# Patient Record
Sex: MALE | Race: BLACK OR AFRICAN AMERICAN | Employment: OTHER | URBAN - METROPOLITAN AREA
[De-identification: names, ages, dates, MRNs, and addresses within clinical notes are randomized per-mention and may not be internally consistent; named-entity substitution may affect disease eponyms.]

---

## 2017-01-05 ENCOUNTER — OFFICE VISIT (OUTPATIENT)
Dept: PAIN MANAGEMENT | Age: 67
End: 2017-01-05

## 2017-01-05 VITALS — HEART RATE: 80 BPM | SYSTOLIC BLOOD PRESSURE: 128 MMHG | RESPIRATION RATE: 17 BRPM | DIASTOLIC BLOOD PRESSURE: 76 MMHG

## 2017-01-05 DIAGNOSIS — M47.816 SPONDYLOSIS OF LUMBAR REGION WITHOUT MYELOPATHY OR RADICULOPATHY: ICD-10-CM

## 2017-01-05 DIAGNOSIS — M47.816 FACET ARTHRITIS OF LUMBAR REGION: ICD-10-CM

## 2017-01-05 DIAGNOSIS — G89.4 CHRONIC PAIN SYNDROME: Primary | ICD-10-CM

## 2017-01-05 DIAGNOSIS — M48.061 LUMBAR FORAMINAL STENOSIS: ICD-10-CM

## 2017-01-05 DIAGNOSIS — M54.50 CHRONIC LEFT-SIDED LOW BACK PAIN WITHOUT SCIATICA: ICD-10-CM

## 2017-01-05 DIAGNOSIS — Z79.899 ENCOUNTER FOR LONG-TERM (CURRENT) USE OF HIGH-RISK MEDICATION: ICD-10-CM

## 2017-01-05 DIAGNOSIS — G89.29 CHRONIC LEFT-SIDED LOW BACK PAIN WITHOUT SCIATICA: ICD-10-CM

## 2017-01-05 RX ORDER — HYDROCODONE BITARTRATE AND ACETAMINOPHEN 10; 325 MG/1; MG/1
TABLET ORAL
Qty: 90 TAB | Refills: 0 | Status: SHIPPED | OUTPATIENT
Start: 2017-01-05 | End: 2017-01-05 | Stop reason: SDUPTHER

## 2017-01-05 RX ORDER — HYDROCODONE BITARTRATE AND ACETAMINOPHEN 10; 325 MG/1; MG/1
TABLET ORAL
Qty: 90 TAB | Refills: 0 | Status: SHIPPED | OUTPATIENT
Start: 2017-01-05 | End: 2017-06-13

## 2017-01-05 NOTE — PATIENT INSTRUCTIONS
1.  Please do not self increase your medication. 2.  Increase norco to 10/325 one tab  Up to 2-3 times per day prn severe pain  3.   Follow up in two months

## 2017-01-05 NOTE — PROGRESS NOTES
Nursing Notes    Patient presents to the office today in follow-up. Reviewed medications with counts as follows:    Rx Date filled Qty Dispensed Pill Count Last Dose Short   norco 7.5/325 12/8/16 90 0 This am. 1 dose Yes. 2 tabs   Mr. Mirtha Engel has a reminder for a \"due or due soon\" health maintenance. I have asked that he contact his primary care provider for follow-up on this health maintenance. Comments: admits to self increasing. Pill count included one day eri period rule     POC UDS was not performed in office today    Any new labs or imaging since last appointment? NO    Have you been to an emergency room (ER) or urgent care clinic since your last visit? NO            Have you been hospitalized since your last visit? NO     If yes, where, when, and reason for visit? Have you seen or consulted any other health care providers outside of the [facility]  since your last visit? NO     If yes, where, when, and reason for visit?

## 2017-03-09 ENCOUNTER — OFFICE VISIT (OUTPATIENT)
Dept: PAIN MANAGEMENT | Age: 67
End: 2017-03-09

## 2017-03-09 VITALS
HEIGHT: 71 IN | BODY MASS INDEX: 25.48 KG/M2 | DIASTOLIC BLOOD PRESSURE: 75 MMHG | WEIGHT: 182 LBS | SYSTOLIC BLOOD PRESSURE: 119 MMHG | HEART RATE: 72 BPM

## 2017-03-09 DIAGNOSIS — G89.29 CHRONIC LEFT-SIDED LOW BACK PAIN WITHOUT SCIATICA: ICD-10-CM

## 2017-03-09 DIAGNOSIS — Z79.899 ENCOUNTER FOR LONG-TERM (CURRENT) USE OF MEDICATIONS: ICD-10-CM

## 2017-03-09 DIAGNOSIS — M54.50 CHRONIC LEFT-SIDED LOW BACK PAIN WITHOUT SCIATICA: ICD-10-CM

## 2017-03-09 DIAGNOSIS — G89.4 CHRONIC PAIN SYNDROME: Primary | ICD-10-CM

## 2017-03-09 DIAGNOSIS — M48.061 LUMBAR FORAMINAL STENOSIS: ICD-10-CM

## 2017-03-09 DIAGNOSIS — M47.816 FACET ARTHRITIS OF LUMBAR REGION: ICD-10-CM

## 2017-03-09 RX ORDER — HYDROCODONE BITARTRATE AND ACETAMINOPHEN 7.5; 325 MG/1; MG/1
1 TABLET ORAL 3 TIMES DAILY
Qty: 90 TAB | Refills: 0 | Status: SHIPPED | OUTPATIENT
Start: 2017-03-09 | End: 2017-06-13 | Stop reason: SDUPTHER

## 2017-03-09 RX ORDER — HYDROCODONE BITARTRATE AND ACETAMINOPHEN 7.5; 325 MG/1; MG/1
1 TABLET ORAL 3 TIMES DAILY
Qty: 90 TAB | Refills: 0 | Status: SHIPPED | OUTPATIENT
Start: 2017-03-09 | End: 2017-05-10 | Stop reason: SDUPTHER

## 2017-03-09 NOTE — PROGRESS NOTES
Nursing Notes    Patient presents to the office today in follow-up. Patient rates his pain at 6/10 on the numerical pain scale. Reviewed medications with counts as follows:    Rx Date filled Qty Dispensed Pill Count Last Dose Short   norco 7.5/325 mg 02/05/17 90 1 today no                                    POC UDS was not performed in office today    Any new labs or imaging since last appointment? NO    Have you been to an emergency room (ER) or urgent care clinic since your last visit? NO            Have you been hospitalized since your last visit? NO     If yes, where, when, and reason for visit? Have you seen or consulted any other health care providers outside of the [address] [place] [address]  since your last visit? NO     If yes, where, when, and reason for visit? HM deferred to pcp.

## 2017-03-09 NOTE — PATIENT INSTRUCTIONS
1.  Continue medications as prescribed, but decrease norco back to 7.5/325 up to 3 times per day  2.   Follow up in two months

## 2017-03-09 NOTE — PROGRESS NOTES
HISTORY OF PRESENT ILLNESS  Royal ZOIE Harman is a 74 y.o. male. HPI  The patient presents today for follow up of chronic  low back pain. No h/o lumbar surgery. Several lumbar epidurals in the past with some temporary benefit. He reports the norco 7.5/325 seemed to work better for him. He is also only working 20 hours per week which seems to have alleviated his pain. He reports he has been able to go a day or two without taking his medication if the pain was not as severe. Colder weather seems to exacerbate his pain. The patient denies any significant changes since last f/u. He tolerates medications without side effects. Royal D Art Record reports no change in sleep or constipation. He does not take a sleep aid. He does not have sleep apnea. He denies constipation. The patient reports 40-60% relief with current medications. He describes his pain as constant, achy and sometimes radiating into his left hip. No h/o joint replacements. 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 The patient reports functional improvement and QOL with pain medication. UDS 11/9/16 reviewed and consistent. Review of Systems   Constitutional: Negative for chills and fever. HENT: Negative for congestion and sore throat. Eyes: Negative for blurred vision and double vision. Respiratory: Negative for cough, shortness of breath and wheezing. Cardiovascular: Negative for chest pain and palpitations. Gastrointestinal: Negative for constipation, heartburn and nausea. Genitourinary: Negative for dysuria and urgency. Musculoskeletal: Positive for back pain (occasional) and joint pain. Negative for neck pain. Skin: Negative for itching and rash. Neurological: Negative for dizziness, seizures and headaches. Endo/Heme/Allergies: Negative for environmental allergies. Does not bruise/bleed easily. Psychiatric/Behavioral: Negative for depression and suicidal ideas. The patient has insomnia. Physical Exam   Constitutional: He is oriented to person, place, and time. He appears well-developed and well-nourished. No distress. HENT:   Head: Normocephalic. Eyes: EOM are normal.   Neck: Normal range of motion. No spinous process tenderness and no muscular tenderness present. Normal range of motion present. Pulmonary/Chest: Effort normal.   Musculoskeletal: He exhibits tenderness (tenderness throughout lumbar spine/painful ROM). Lumbar back: He exhibits decreased range of motion (painful), tenderness, pain and spasm. Neurological: He is alert and oriented to person, place, and time. Gait (antalgic) abnormal.   Psychiatric: He has a normal mood and affect. His behavior is normal. Judgment and thought content normal.   Nursing note and vitals reviewed. ASSESSMENT and PLAN  Encounter Diagnoses   Name Primary?  Chronic pain syndrome Yes    Encounter for long-term (current) use of medications     Facet arthritis of lumbar region     Lumbar foraminal stenosis     Chronic left-sided low back pain without sciatica      Orders Placed This Encounter    HYDROcodone-acetaminophen (NORCO) 7.5-325 mg per tablet    HYDROcodone-acetaminophen (NORCO) 7.5-325 mg per tablet     Patient Instructions   1. Continue medications as prescribed, but decrease norco back to 7.5/325 up to 3 times per day  2.   Follow up in two months

## 2017-05-10 RX ORDER — HYDROCODONE BITARTRATE AND ACETAMINOPHEN 7.5; 325 MG/1; MG/1
1 TABLET ORAL 3 TIMES DAILY
Qty: 90 TAB | Refills: 0 | Status: SHIPPED | OUTPATIENT
Start: 2017-05-10 | End: 2017-06-13 | Stop reason: SDUPTHER

## 2017-05-10 NOTE — TELEPHONE ENCOUNTER
The pt called the office because his appt for 05/11/17 was cancelled by the office. The provider will not be here. The pt will need medications to make it to the next scheduled appt. He is tolerating his medication at this time. A  was obtained and there were no aberrancies noted. His last fill for his norco was 04/08/17.

## 2017-06-13 ENCOUNTER — OFFICE VISIT (OUTPATIENT)
Dept: PAIN MANAGEMENT | Age: 67
End: 2017-06-13

## 2017-06-13 VITALS — HEART RATE: 77 BPM | DIASTOLIC BLOOD PRESSURE: 81 MMHG | SYSTOLIC BLOOD PRESSURE: 113 MMHG

## 2017-06-13 DIAGNOSIS — G89.4 CHRONIC PAIN SYNDROME: ICD-10-CM

## 2017-06-13 DIAGNOSIS — M47.816 FACET ARTHRITIS OF LUMBAR REGION: ICD-10-CM

## 2017-06-13 DIAGNOSIS — G89.29 CHRONIC LEFT-SIDED LOW BACK PAIN WITHOUT SCIATICA: Primary | ICD-10-CM

## 2017-06-13 DIAGNOSIS — M48.061 LUMBAR FORAMINAL STENOSIS: ICD-10-CM

## 2017-06-13 DIAGNOSIS — Z79.899 ENCOUNTER FOR LONG-TERM (CURRENT) USE OF MEDICATIONS: ICD-10-CM

## 2017-06-13 DIAGNOSIS — M47.816 SPONDYLOSIS OF LUMBAR REGION WITHOUT MYELOPATHY OR RADICULOPATHY: ICD-10-CM

## 2017-06-13 DIAGNOSIS — M54.50 CHRONIC LEFT-SIDED LOW BACK PAIN WITHOUT SCIATICA: Primary | ICD-10-CM

## 2017-06-13 LAB
ALCOHOL UR POC: NORMAL
AMPHETAMINES UR POC: NEGATIVE
BARBITURATES UR POC: NEGATIVE
BENZODIAZEPINES UR POC: NEGATIVE
BUPRENORPHINE UR POC: NORMAL
CANNABINOIDS UR POC: NEGATIVE
CARISOPRODOL UR POC: NORMAL
COCAINE UR POC: NEGATIVE
FENTANYL UR POC: NORMAL
MDMA/ECSTASY UR POC: NEGATIVE
METHADONE UR POC: NEGATIVE
METHAMPHETAMINE UR POC: NEGATIVE
METHYLPHENIDATE UR POC: NEGATIVE
OPIATES UR POC: NEGATIVE
OXYCODONE UR POC: NEGATIVE
PHENCYCLIDINE UR POC: NEGATIVE
PROPOXYPHENE UR POC: NORMAL
TRAMADOL UR POC: NORMAL
TRICYCLICS UR POC: NEGATIVE

## 2017-06-13 RX ORDER — HYDROCODONE BITARTRATE AND ACETAMINOPHEN 7.5; 325 MG/1; MG/1
1 TABLET ORAL 3 TIMES DAILY
Qty: 90 TAB | Refills: 0 | Status: SHIPPED | OUTPATIENT
Start: 2017-06-13 | End: 2017-09-18 | Stop reason: SDUPTHER

## 2017-06-13 RX ORDER — HYDROCODONE BITARTRATE AND ACETAMINOPHEN 7.5; 325 MG/1; MG/1
1 TABLET ORAL 3 TIMES DAILY
Qty: 90 TAB | Refills: 0 | Status: SHIPPED | OUTPATIENT
Start: 2017-07-12 | End: 2017-09-18 | Stop reason: SDUPTHER

## 2017-06-13 RX ORDER — HYDROCODONE BITARTRATE AND ACETAMINOPHEN 7.5; 325 MG/1; MG/1
1 TABLET ORAL 3 TIMES DAILY
Qty: 90 TAB | Refills: 0 | Status: SHIPPED | OUTPATIENT
Start: 2017-08-11 | End: 2017-09-18 | Stop reason: SDUPTHER

## 2017-06-13 NOTE — PROGRESS NOTES
Nursing Notes    Patient presents to the office today in follow-up. Mr. Yasir Henry has a reminder for a \"due or due soon\" health maintenance. I have asked that he contact his primary care provider for follow-up on this health maintenance. Comments: did not bring medications. States he forgot. Reports taking a dose this morning.  reviewed and noted last filled norco 7.5/325 5/15/17    POC UDS was performed in office today    Any new labs or imaging since last appointment? NO    Have you been to an emergency room (ER) or urgent care clinic since your last visit? NO            Have you been hospitalized since your last visit? NO     If yes, where, when, and reason for visit? Have you seen or consulted any other health care providers outside of the [address] [place] [address]  since your last visit? NO     If yes, where, when, and reason for visit?

## 2017-06-30 NOTE — PROGRESS NOTES
HISTORY OF PRESENT ILLNESS  Royal ZOIE Quiñones is a 74 y.o. male. HPI Comments: Meds help with pain control and quality of life. No new side effects reported today. Visit survey reviewed and will be scanned.  reviewed. Recent average level of pain(out of 10)-5  Chief complaint low back pain  Chronic pain  Using hydrocodone 7.5 mg 3 times a day as needed  Medication helps improve general activity, mood, walking, sleep, enjoyment of life      Measuring clinical outcomes of chronic pain patients. This was reviewed today. The survey will be scanned. Please see the survey for details. Total score-4      Review of Systems   Constitutional: Negative for chills and fever. HENT: Negative for congestion and sore throat. Eyes: Negative for blurred vision and double vision. Respiratory: Negative for cough, shortness of breath and wheezing. Cardiovascular: Negative for chest pain and palpitations. Gastrointestinal: Negative for constipation, heartburn and nausea. Genitourinary: Negative for dysuria and urgency. Musculoskeletal: Positive for back pain (occasional) and joint pain. Negative for neck pain. Skin: Negative for itching and rash. Neurological: Negative for dizziness, seizures and headaches. Endo/Heme/Allergies: Negative for environmental allergies. Does not bruise/bleed easily. Psychiatric/Behavioral: Negative for depression and suicidal ideas. The patient has insomnia. Physical Exam   Constitutional: He appears well-developed and well-nourished. He is cooperative. He does not have a sickly appearance. HENT:   Head: Normocephalic and atraumatic. Right Ear: External ear normal. No drainage. Left Ear: External ear normal. No drainage. Nose: Nose normal.   Eyes: Lids are normal. Right eye exhibits no discharge. Left eye exhibits no discharge. Right conjunctiva has no hemorrhage. Left conjunctiva has no hemorrhage. Neck: Neck supple. No tracheal deviation present.  No thyroid mass present. Pulmonary/Chest: Effort normal. No respiratory distress. Neurological: He is alert. No cranial nerve deficit. Skin: Skin is intact. No rash noted. Psychiatric: His speech is normal. His affect is not angry. He does not express inappropriate judgment. Nursing note and vitals reviewed. ASSESSMENT and PLAN  Encounter Diagnoses   Name Primary?  Chronic left-sided low back pain without sciatica Yes    Encounter for long-term (current) use of medications     Chronic pain syndrome     Spondylosis of lumbar region without myelopathy or radiculopathy     Facet arthritis of lumbar region [facility])     Lumbar foraminal stenosis    No indicators for recent medication misuse.  reviewed. Pain Meds and Quality Of Life have been reviewed. Nonpharmacologic therapy and non-opioid pharmacologic therapy were considered. If opioid therapy is prescribed, this is only if the expected benefits are anticipated to outweigh risks. Possible changes to treatment plan considered. Support/education given as needed. Today-medications are as listed. No significant changes to medications. Follow up -- 3 months.  --Urine test or oral swab today. Also, the prescription monitoring program was reviewed today. The majority of today's visit was spent counseling and coordinating care. Total visit time-40 minutes. -Dragon medical dictation software was used for portions of this report. Unintended errors may occur. 64.7

## 2017-09-18 ENCOUNTER — OFFICE VISIT (OUTPATIENT)
Dept: PAIN MANAGEMENT | Age: 67
End: 2017-09-18

## 2017-09-18 VITALS
WEIGHT: 182 LBS | BODY MASS INDEX: 25.48 KG/M2 | RESPIRATION RATE: 12 BRPM | DIASTOLIC BLOOD PRESSURE: 83 MMHG | HEIGHT: 71 IN | HEART RATE: 91 BPM | SYSTOLIC BLOOD PRESSURE: 120 MMHG | TEMPERATURE: 98.1 F

## 2017-09-18 DIAGNOSIS — M54.50 CHRONIC LEFT-SIDED LOW BACK PAIN WITHOUT SCIATICA: Primary | ICD-10-CM

## 2017-09-18 DIAGNOSIS — G89.4 CHRONIC PAIN SYNDROME: ICD-10-CM

## 2017-09-18 DIAGNOSIS — M47.816 SPONDYLOSIS OF LUMBAR REGION WITHOUT MYELOPATHY OR RADICULOPATHY: ICD-10-CM

## 2017-09-18 DIAGNOSIS — M48.061 LUMBAR FORAMINAL STENOSIS: ICD-10-CM

## 2017-09-18 DIAGNOSIS — G89.29 CHRONIC LEFT-SIDED LOW BACK PAIN WITHOUT SCIATICA: Primary | ICD-10-CM

## 2017-09-18 RX ORDER — NALOXONE HYDROCHLORIDE 4 MG/.1ML
1 SPRAY NASAL AS NEEDED
Qty: 1 EACH | Refills: 1 | Status: SHIPPED | OUTPATIENT
Start: 2017-09-18 | End: 2018-12-31

## 2017-09-18 RX ORDER — HYDROCODONE BITARTRATE AND ACETAMINOPHEN 7.5; 325 MG/1; MG/1
TABLET ORAL
COMMUNITY
End: 2018-03-13 | Stop reason: SDUPTHER

## 2017-09-18 RX ORDER — HYDROCODONE BITARTRATE AND ACETAMINOPHEN 7.5; 325 MG/1; MG/1
1 TABLET ORAL 3 TIMES DAILY
Qty: 90 TAB | Refills: 0 | Status: SHIPPED | OUTPATIENT
Start: 2017-11-16 | End: 2017-12-12 | Stop reason: SDUPTHER

## 2017-09-18 RX ORDER — HYDROCODONE BITARTRATE AND ACETAMINOPHEN 7.5; 325 MG/1; MG/1
1 TABLET ORAL 3 TIMES DAILY
Qty: 90 TAB | Refills: 0 | Status: SHIPPED | OUTPATIENT
Start: 2017-09-18 | End: 2017-12-12 | Stop reason: SDUPTHER

## 2017-09-18 RX ORDER — HYDROCODONE BITARTRATE AND ACETAMINOPHEN 7.5; 325 MG/1; MG/1
1 TABLET ORAL 3 TIMES DAILY
Qty: 90 TAB | Refills: 0 | Status: SHIPPED | OUTPATIENT
Start: 2017-10-17 | End: 2017-12-12 | Stop reason: SDUPTHER

## 2017-09-18 RX ORDER — CYCLOBENZAPRINE HCL 10 MG
10 TABLET ORAL
Qty: 30 TAB | Refills: 3 | Status: SHIPPED | OUTPATIENT
Start: 2017-09-18 | End: 2018-06-12 | Stop reason: SDUPTHER

## 2017-09-18 NOTE — MR AVS SNAPSHOT
Visit Information Date & Time Provider Department Dept. Phone Encounter #  
 9/18/2017  2:40 PM Adele Cha, [address] [place] [address] Pain Management [phone] [id number] Follow-up Instructions Return in about 3 months (around 12/18/2017). Upcoming Health Maintenance Date Due Hepatitis C Screening 1950 DTaP/Tdap/Td series (1 - Tdap) 2/19/1973 FOBT Q 1 YEAR AGE 50-75 2/19/2002 ZOSTER VACCINE AGE 60> 12/19/2011 GLAUCOMA SCREENING Q2Y 2/19/2017 Pneumococcal 65+ Low/Medium Risk (1 of 2 - PCV13) 2/19/2017 MEDICARE YEARLY EXAM 2/19/2017 INFLUENZA AGE 9 TO ADULT 8/1/2017 Allergies as of 9/18/2017  Review Complete On: 9/18/2017 By: RAJWINDER Melchor No Known Allergies Current Immunizations  Never Reviewed No immunizations on file. Not reviewed this visit You Were Diagnosed With   
  
 Codes Comments Chronic left-sided low back pain without sciatica    -  Primary ICD-10-CM: M54.5, G89.29 ICD-9-CM: 724.2, 338.29 Spondylosis of lumbar region without myelopathy or radiculopathy     ICD-10-CM: M47.816 ICD-9-CM: 721.3 Lumbar foraminal stenosis     ICD-10-CM: M99.83 ICD-9-CM: 724.02 Chronic pain syndrome     ICD-10-CM: G89.4 ICD-9-CM: 338. 4 Vitals BP Pulse Temp Resp Height(growth percentile) Weight(growth percentile) 120/83 91 98.1 °F (36.7 °C) 12 5' 11\" (1.803 m) 182 lb (82.6 kg) BMI Smoking Status 25.38 kg/m2 Former Smoker BMI and BSA Data Body Mass Index Body Surface Area  
 25.38 kg/m 2 2.03 m 2 Preferred Pharmacy Pharmacy Name Phone [address], [address] [place] [address] [phone] Your Updated Medication List  
  
   
This list is accurate as of: 9/18/17  3:12 PM.  Always use your most recent med list.  
  
  
  
  
 atorvastatin 20 mg tablet Commonly known as:  LIPITOR Take 10 mg by mouth daily. ASIF ASPIRIN PO Take  by mouth. CENTRUM [employer]IST ENERGY PO Take  by mouth. cyclobenzaprine 10 mg tablet Commonly known as:  FLEXERIL Take 1 Tab by mouth nightly for 30 days. * HYDROcodone-acetaminophen 7.5-325 mg per tablet Commonly known as:  Ashlee Laity Take  by mouth.  
  
 * HYDROcodone-acetaminophen 7.5-325 mg per tablet Commonly known as:  [place]ar Laity Take 1 Tab by mouth three (3) times daily for 30 days. Max Daily Amount: 3 Tabs. * HYDROcodone-acetaminophen 7.5-325 mg per tablet Commonly known as:  Ashlee Laity Take 1 Tab by mouth three (3) times daily for 30 days. Max Daily Amount: 3 Tabs. Start taking on:  10/17/2017  
  
 * HYDROcodone-acetaminophen 7.5-325 mg per tablet Commonly known as:  Wildomar Laity Take 1 Tab by mouth three (3) times daily for 30 days. Max Daily Amount: 3 Tabs. Start taking on:  11/16/2017  
  
 naloxone 4 mg/actuation nasal spray Commonly known as:  NARCAN  
1 Anderson by IntraNASal route as needed. Indications: OPIATE-INDUCED RESPIRATORY DEPRESSION  
  
 VIAGRA 50 mg tablet Generic drug:  sildenafil citrate Take 50 mg by mouth as needed. * Notice: This list has 4 medication(s) that are the same as other medications prescribed for you. Read the directions carefully, and ask your doctor or other care provider to review them with you. Prescriptions Printed Refills HYDROcodone-acetaminophen (NORCO) 7.5-325 mg per tablet 0 Sig: Take 1 Tab by mouth three (3) times daily for 30 days. Max Daily Amount: 3 Tabs. Class: Print Route: Oral  
 HYDROcodone-acetaminophen (NORCO) 7.5-325 mg per tablet 0 Starting on: 10/17/2017 Sig: Take 1 Tab by mouth three (3) times daily for 30 days. Max Daily Amount: 3 Tabs. Class: Print Route: Oral  
 HYDROcodone-acetaminophen (NORCO) 7.5-325 mg per tablet 0 Starting on: 11/16/2017 Sig: Take 1 Tab by mouth three (3) times daily for 30 days. Max Daily Amount: 3 Tabs. Class: Print Route: Oral  
 naloxone (NARCAN) 4 mg/actuation nasal spray 1 Si Pat[place] by IntraNASal route as needed. Indications: OPIATE-INDUCED RESPIRATORY DEPRESSION Class: Print Route: IntraNASal  
  
Prescriptions Sent to Pharmacy Refills  
 cyclobenzaprine (FLEXERIL) 10 mg tablet 3 Sig: Take 1 Tab by mouth nightly for 30 days. Class: Normal  
 Pharmacy: [facility] , [address] [place] [address] #: [phone] Route: Oral  
  
Follow-up Instructions Return in about 3 months (around 2017). Patient Instructions 1. Continue current plan with no evidence of addiction or diversion. Stable on current medication without adverse events. 2. Refill hydrocodone 7.5/325 mg up to 2 times daily as needed for pain. 3. Refill Flexeril 10 mg once nightly as needed. 4. Continue home therapy. 5. Add naloxone 4 mg nasal spray for opioid induced respiratory depression emergency only. Extensive counseling was provided regarding this medication. Instructions were given to take home 6. Discussed risks of addiction, dependency, and opioid induced hyperalgesia. 7. Return to clinic in 3 months Introducing [facility]! Laila Wyman introduces [employer] patient portal. Now you can access parts of your medical record, email your doctor's office, and request medication refills online. 1. In your internet browser, go to [email or web address] 2. Click on the First Time User? Click Here link in the Sign In box. You will see the New Member Sign Up page. 3. Enter your [employer] Access Code exactly as it appears below. You will not need to use this code after youve completed the sign-up process. If you do not sign up before the expiration date, you must request a new code. · [employer] Access Code: [email or web address] Expires: 12/17/2017  3:06 PM 
 
4. Enter the last four digits of your Social Security Number (xxxx) and Date of Birth (mm/dd/yyyy) as indicated and click Submit. You will be taken to the next sign-up page. 5. Create a [employer] ID. This will be your [employer] login ID and cannot be changed, so think of one that is secure and easy to remember. 6. Create a [employer] password. You can change your password at any time. 7. Enter your Password Reset Question and Answer. This can be used at a later time if you forget your password. 8. Enter your e-mail address. You will receive e-mail notification when new information is available in [address] 9. Click Sign Up. You can now view and download portions of your medical record. 10. Click the Download Summary menu link to download a portable copy of your medical information. If you have questions, please visit the Frequently Asked Questions section of the [employer] website. Remember, [employer] is NOT to be used for urgent needs. For medical emergencies, dial 911. Now available from your iPhone and Android! Please provide this summary of care documentation to your next provider. If you have any questions after today's visit, please call [phone].

## 2017-09-18 NOTE — PROGRESS NOTES
Nursing Notes    Patient presents to the office today in follow-up. Patient rates his pain at 6/10 on the numerical pain scale. Reviewed medications with counts as follows:    Rx Date filled Qty Dispensed Pill Count Last Dose Short   Hydrocodone 7.5-325mg 08/11/17 90 0 yestrday no                                          Comments:     POC UDS was not performed in office today    Any new labs or imaging since last appointment? NO    Have you been to an emergency room (ER) or urgent care clinic since your last visit? NO            Have you been hospitalized since your last visit? NO     If yes, where, when, and reason for visit? Have you seen or consulted any other health care providers outside of the [address] [place] [address]  since your last visit? NO     If yes, where, when, and reason for visit? HM deferred to pcp.

## 2017-09-18 NOTE — PATIENT INSTRUCTIONS
1. Continue current plan with no evidence of addiction or diversion. Stable on current medication without adverse events. 2. Refill hydrocodone 7.5/325 mg up to 2 times daily as needed for pain. 3. Refill Flexeril 10 mg once nightly as needed. 4. Continue home therapy. 5. Add naloxone 4 mg nasal spray for opioid induced respiratory depression emergency only. Extensive counseling was provided regarding this medication. Instructions were given to take home   6. Discussed risks of addiction, dependency, and opioid induced hyperalgesia.    7. Return to clinic in 3 months

## 2017-09-18 NOTE — PROGRESS NOTES
HISTORY OF PRESENT ILLNESS  Royal ZOIE Eng is a 74 y.o. male    HPI: Mr. Dasia Eng  returns today for f/u of chronic low back pain. No h/o lumbar surgery. Several lumbar epidurals in the past with some temporary benefit. His last injection was last year. No interested in repeating injections. PT recently with good improvement.  for [employer]. Spent some time since I have seen Mr. Antonio Swanson. He has been doing well with his current treatment plan which has been offering significant pain control. He continues to use his hydrocodone on an as-needed basis only. He is now working as a  for out transportation. He says that he avoid using his medication during working times. He is overall doing well and happy with his current treatment plan. He reports no new complaints today. I will have him follow-up in 3 months for further evaluation and recommendation per    Because the patient's current regimen places him/her at increased risk for possible overdose, a prescription for naloxone nasal spray is being provided. The patient understands that this medication is only to be used in the setting of a possible overdose and that inadvertent use of this medication could precipitate overt withdrawal.    Current medication management consists of Hydrocodone 7.5/325 mg b.i.d. p.r.n., and Flexeril 10 mg once nightly as needed. Medications are helping with pain control and quality of life. His pain is 3/10 with medication and 7/10 without. Pt describes pain as aching. Aggravating factors include lying on his stomach, standing or sitting for too long. Relieved with rest, medication, lying down in fetal position, and avoiding painful activities. Current treatment is helping to improve general activity, mood, walking, sleep, enjoyment of life    He  is otherwise doing well with no other complaints today.  He denies any adverse events including nausea, vomiting, dizziness, constipation, hallucinations, or seizures. Education class 12/09/2015       No Known Allergies    History reviewed. No pertinent surgical history. Review of Systems   Constitutional: Negative for chills and fever. HENT: Negative for congestion and sore throat. Eyes: Negative for blurred vision and double vision. Respiratory: Negative for cough, shortness of breath and wheezing. Cardiovascular: Negative for chest pain and palpitations. Gastrointestinal: Negative for constipation, heartburn and nausea. Genitourinary: Negative for dysuria and urgency. Musculoskeletal: Positive for back pain and joint pain ( right shoulder). Negative for neck pain. Skin: Negative for itching and rash. Neurological: Negative for dizziness, seizures and headaches. Endo/Heme/Allergies: Negative for environmental allergies. Does not bruise/bleed easily. Psychiatric/Behavioral: Negative for depression and suicidal ideas. The patient has insomnia. Physical Exam   Constitutional: He is oriented to person, place, and time and well-developed, well-nourished, and in no distress. No distress. HENT:   Head: Normocephalic and atraumatic. Eyes: EOM are normal.   Neck: Normal range of motion. Pulmonary/Chest: Effort normal.   Musculoskeletal: Normal range of motion. Neurological: He is alert and oriented to person, place, and time. He has normal reflexes. Skin: Skin is dry. No rash noted. No erythema. Psychiatric: Mood, memory, affect and judgment normal.   Nursing note and vitals reviewed. ASSESSMENT:    1. Chronic left-sided low back pain without sciatica    2. Spondylosis of lumbar region without myelopathy or radiculopathy    3. Lumbar foraminal stenosis    4. Chronic pain syndrome         [address] [place] [address] Prescription Monitoring Program was reviewed which does not demonstrate aberrancies and/or inconsistencies with regard to the historical prescribing of controlled medications to this patient by other providers.       PLAN / Pt Instructions:  1. Continue current plan with no evidence of addiction or diversion. Stable on current medication without adverse events. 2. Refill hydrocodone 7.5/325 mg up to 2 times daily as needed for pain. 3. Refill Flexeril 10 mg once nightly as needed. 4. Continue home therapy. 5. Add naloxone 4 mg nasal spray for opioid induced respiratory depression emergency only. Extensive counseling was provided regarding this medication. Instructions were given to take home   6. Discussed risks of addiction, dependency, and opioid induced hyperalgesia. 7. Return to clinic in 3 months    Medications Ordered Today   Medications    HYDROcodone-acetaminophen (NORCO) 7.5-325 mg per tablet     Sig: Take 1 Tab by mouth three (3) times daily for 30 days. Max Daily Amount: 3 Tabs. Dispense:  90 Tab     Refill:  0    HYDROcodone-acetaminophen (NORCO) 7.5-325 mg per tablet     Sig: Take 1 Tab by mouth three (3) times daily for 30 days. Max Daily Amount: 3 Tabs. Dispense:  90 Tab     Refill:  0    HYDROcodone-acetaminophen (NORCO) 7.5-325 mg per tablet     Sig: Take 1 Tab by mouth three (3) times daily for 30 days. Max Daily Amount: 3 Tabs. Dispense:  90 Tab     Refill:  0    cyclobenzaprine (FLEXERIL) 10 mg tablet     Sig: Take 1 Tab by mouth nightly for 30 days. Dispense:  30 Tab     Refill:  3    naloxone (NARCAN) 4 mg/actuation nasal spray     [place] by IntraNASal route as needed. Indications: OPIATE-INDUCED RESPIRATORY DEPRESSION     Dispense:  1 Each     Refill:  1       Pain medications prescribed with the objective of pain relief and improved physical and psychosocial function in this patient. Spent 25 minutes with patient today reviewing the treatment plan, goals of treatment plan, and limitations of the treatment plan, to include the potential for side effects from medications and procedures. Cecilia Murcia 2017      Note: Please excuse any typographical errors. Voice recognition software was used for this note and may cause mistakes.

## 2017-12-12 ENCOUNTER — OFFICE VISIT (OUTPATIENT)
Dept: PAIN MANAGEMENT | Age: 67
End: 2017-12-12

## 2017-12-12 VITALS
SYSTOLIC BLOOD PRESSURE: 110 MMHG | DIASTOLIC BLOOD PRESSURE: 68 MMHG | WEIGHT: 182 LBS | HEART RATE: 76 BPM | TEMPERATURE: 97.8 F | HEIGHT: 71 IN | RESPIRATION RATE: 16 BRPM | BODY MASS INDEX: 25.48 KG/M2

## 2017-12-12 DIAGNOSIS — M48.061 LUMBAR FORAMINAL STENOSIS: ICD-10-CM

## 2017-12-12 DIAGNOSIS — M54.50 CHRONIC LEFT-SIDED LOW BACK PAIN WITHOUT SCIATICA: ICD-10-CM

## 2017-12-12 DIAGNOSIS — M47.816 FACET ARTHRITIS OF LUMBAR REGION: ICD-10-CM

## 2017-12-12 DIAGNOSIS — G89.29 CHRONIC LEFT-SIDED LOW BACK PAIN WITHOUT SCIATICA: ICD-10-CM

## 2017-12-12 DIAGNOSIS — M47.816 SPONDYLOSIS OF LUMBAR REGION WITHOUT MYELOPATHY OR RADICULOPATHY: ICD-10-CM

## 2017-12-12 DIAGNOSIS — Z79.899 ENCOUNTER FOR LONG-TERM (CURRENT) USE OF HIGH-RISK MEDICATION: Primary | ICD-10-CM

## 2017-12-12 DIAGNOSIS — G89.4 CHRONIC PAIN SYNDROME: ICD-10-CM

## 2017-12-12 LAB
ALCOHOL UR POC: NORMAL
AMPHETAMINES UR POC: NORMAL
BARBITURATES UR POC: NORMAL
BENZODIAZEPINES UR POC: NORMAL
BUPRENORPHINE UR POC: NORMAL
CANNABINOIDS UR POC: NORMAL
CARISOPRODOL UR POC: NORMAL
COCAINE UR POC: NORMAL
FENTANYL UR POC: NORMAL
MDMA/ECSTASY UR POC: NORMAL
METHADONE UR POC: NORMAL
METHAMPHETAMINE UR POC: NORMAL
METHYLPHENIDATE UR POC: NORMAL
OPIATES UR POC: NORMAL
OXYCODONE UR POC: NORMAL
PHENCYCLIDINE UR POC: NORMAL
PROPOXYPHENE UR POC: NORMAL
TRAMADOL UR POC: NORMAL
TRICYCLICS UR POC: NORMAL

## 2017-12-12 RX ORDER — HYDROCODONE BITARTRATE AND ACETAMINOPHEN 7.5; 325 MG/1; MG/1
1 TABLET ORAL
Qty: 120 TAB | Refills: 0 | Status: SHIPPED | OUTPATIENT
Start: 2017-12-14 | End: 2018-03-13 | Stop reason: SDUPTHER

## 2017-12-12 RX ORDER — HYDROCODONE BITARTRATE AND ACETAMINOPHEN 7.5; 325 MG/1; MG/1
1 TABLET ORAL
Qty: 120 TAB | Refills: 0 | Status: SHIPPED | OUTPATIENT
Start: 2018-01-13 | End: 2018-03-13 | Stop reason: SDUPTHER

## 2017-12-12 RX ORDER — HYDROCODONE BITARTRATE AND ACETAMINOPHEN 7.5; 325 MG/1; MG/1
1 TABLET ORAL
Qty: 120 TAB | Refills: 0 | Status: SHIPPED | OUTPATIENT
Start: 2018-02-12 | End: 2018-03-13 | Stop reason: SDUPTHER

## 2017-12-12 NOTE — PROGRESS NOTES
Nursing Notes    Patient presents to the office today in follow-up. Patient rates his pain at 6/10 on the numerical pain scale. Reviewed medications with counts as follows:    Rx Date filled Qty Dispensed Pill Count Last Dose Short   NORCO 7.5-325 MG TAB  11/16/17 90 1 12/11/17 YES, 2 days + 1 eri day                                          Comments:     PATIENT STATES HE IS SHORT AND HAVING TO SELF INCREASE DOSAGE OF THE NORCO 7.5-325 MG TABS DUE TO PAIN. POC UDS was performed in office today    Any new labs or imaging since last appointment? Yes, blood work     Have you been to an emergency room (ER) or urgent care clinic since your last visit? NO            Have you been hospitalized since your last visit? NO     If yes, where, when, and reason for visit? Have you seen or consulted any other health care providers outside of the [address] [place] [address]  since your last visit? YES, PCP. If yes, where, when, and reason for visit? HM deferred to pcp.

## 2017-12-12 NOTE — PATIENT INSTRUCTIONS
1. Continue current plan with no evidence of addiction or diversion. Stable on current medication without adverse events. 2. Refill and adjust hydrocodone 7.5/325 mg up to 4 times daily as needed. 3. Continue Flexeril 10 mg once nightly as needed. 4. Continue home therapy. 5. Naloxone 4 mg nasal spray for opioid induced respiratory depression emergency only. 6. Discussed risks of addiction, dependency, and opioid induced hyperalgesia.    7. Return to clinic in 3 months

## 2017-12-12 NOTE — MR AVS SNAPSHOT
Visit Information Date & Time Provider Department Dept. Phone Encounter #  
 12/12/2017  2:40 PM Moisés Castro [facility] [phone] [id number] Follow-up Instructions Return in about 3 months (around 3/12/2018). Upcoming Health Maintenance Date Due Hepatitis C Screening 1950 DTaP/Tdap/Td series (1 - Tdap) 2/19/1973 FOBT Q 1 YEAR AGE 50-75 2/19/2002 ZOSTER VACCINE AGE 60> 12/19/2011 GLAUCOMA SCREENING Q2Y 2/19/2017 Pneumococcal 65+ Low/Medium Risk (1 of 2 - PCV13) 2/19/2017 MEDICARE YEARLY EXAM 2/19/2017 Influenza Age 5 to Adult 8/1/2017 Allergies as of 12/12/2017  Review Complete On: 12/12/2017 By: RAJWINDER Chaudhary No Known Allergies Current Immunizations  Never Reviewed No immunizations on file. Not reviewed this visit You Were Diagnosed With   
  
 Codes Comments Encounter for long-term (current) use of high-risk medication    -  Primary ICD-10-CM: J52.550 ICD-9-CM: V58.69 Chronic left-sided low back pain without sciatica     ICD-10-CM: M54.5, G89.29 ICD-9-CM: 724.2, 338.29 Chronic pain syndrome     ICD-10-CM: G89.4 ICD-9-CM: 338.4 Spondylosis of lumbar region without myelopathy or radiculopathy     ICD-10-CM: M47.816 ICD-9-CM: 721.3 Facet arthritis of lumbar region [facility])     ICD-10-CM: M46.96 
ICD-9-CM: 721.3 Lumbar foraminal stenosis     ICD-10-CM: M99.83 ICD-9-CM: 724.02 Vitals BP Pulse Temp Resp Height(growth percentile) Weight(growth percentile) 110/68 (BP 1 Location: Left arm, BP Patient Position: Sitting) 76 97.8 °F (36.6 °C) (Oral) 16 5' 11\" (1.803 m) 182 lb (82.6 kg) BMI Smoking Status 25.38 kg/m2 Former Smoker Vitals History BMI and BSA Data Body Mass Index Body Surface Area  
 25.38 kg/m 2 2.03 m 2 Preferred Pharmacy Pharmacy Name Phone [address] [place] [address], [address] [place] [address]  [phone] Your Updated Medication List  
  
   
This list is accurate as of: 12/12/17  4:03 PM.  Always use your most recent med list.  
  
  
  
  
 atorvastatin 20 mg tablet Commonly known as:  LIPITOR Take 10 mg by mouth daily. ASIF ASPIRIN PO Take  by mouth. CENTRUM SPECIALIST ENERGY PO Take  by mouth. FISH  mg Cap Generic drug:  Omega-3 Fatty Acids Take  by mouth.  
  
 * HYDROcodone-acetaminophen 7.5-325 mg per tablet Commonly known as:  Wayna Glaze Take  by mouth.  
  
 * HYDROcodone-acetaminophen 7.5-325 mg per tablet Commonly known as:  Wayna Glaze Take 1 Tab by mouth four (4) times daily as needed for Pain for up to 30 days. Max Daily Amount: 4 Tabs. Start taking on:  12/14/2017  
  
 * HYDROcodone-acetaminophen 7.5-325 mg per tablet Commonly known as:  Wayna Glaze Take 1 Tab by mouth four (4) times daily as needed for Pain for up to 30 days. Max Daily Amount: 4 Tabs. Start taking on:  1/13/2018  
  
 * HYDROcodone-acetaminophen 7.5-325 mg per tablet Commonly known as:  Wayna Glaze Take 1 Tab by mouth four (4) times daily as needed for Pain for up to 30 days. Max Daily Amount: 4 Tabs. Start taking on:  2/12/2018  
  
 naloxone 4 mg/actuation nasal spray Commonly known as:  NARCAN  
1 [place] by IntraNASal route as needed. Indications: OPIATE-INDUCED RESPIRATORY DEPRESSION  
  
 VIAGRA 50 mg tablet Generic drug:  sildenafil citrate Take 50 mg by mouth as needed. * Notice: This list has 4 medication(s) that are the same as other medications prescribed for you. Read the directions carefully, and ask your doctor or other care provider to review them with you. Prescriptions Printed Refills HYDROcodone-acetaminophen (NORCO) 7.5-325 mg per tablet 0 Starting on: 12/14/2017 Sig: Take 1 Tab by mouth four (4) times daily as needed for Pain for up to 30 days. Max Daily Amount: 4 Tabs. Class: Print Route: Oral  
 HYDROcodone-acetaminophen (NORCO) 7.5-325 mg per tablet 0 Starting on: 1/13/2018 Sig: Take 1 Tab by mouth four (4) times daily as needed for Pain for up to 30 days. Max Daily Amount: 4 Tabs. Class: Print Route: Oral  
 HYDROcodone-acetaminophen (NORCO) 7.5-325 mg per tablet 0 Starting on: 2/12/2018 Sig: Take 1 Tab by mouth four (4) times daily as needed for Pain for up to 30 days. Max Daily Amount: 4 Tabs. Class: Print Route: Oral  
  
We Performed the Following AMB POC DRUG SCREEN () [ [facility] [place]] DRUG SCREEN [[id number] Custom] Follow-up Instructions Return in about 3 months (around 3/12/2018). Patient Instructions 1. Continue current plan with no evidence of addiction or diversion. Stable on current medication without adverse events. 2. Refill and adjust hydrocodone 7.5/325 mg up to 4 times daily as needed. 3. Continue Flexeril 10 mg once nightly as needed. 4. Continue home therapy. 5. Naloxone 4 mg nasal spray for opioid induced respiratory depression emergency only. 6. Discussed risks of addiction, dependency, and opioid induced hyperalgesia. 7. Return to clinic in 3 months Introducing [facility]! Maribeth Lubin introduces [employer] patient portal. Now you can access parts of your medical record, email your doctor's office, and request medication refills online. 1. In your internet browser, go to [email or web address] 2. Click on the First Time User? Click Here link in the Sign In box. You will see the New Member Sign Up page. 3. Enter your [employer] Access Code exactly as it appears below. You will not need to use this code after youve completed the sign-up process. If you do not sign up before the expiration date, you must request a new code. · [employer] Access Code: [email or web address] Expires: 12/17/2017  2:06 PM 
 
4. Enter the last four digits of your Social Security Number (xxxx) and Date of Birth (mm/dd/yyyy) as indicated and click Submit. You will be taken to the next sign-up page. 5. Create a [employer] ID. This will be your [employer] login ID and cannot be changed, so think of one that is secure and easy to remember. 6. Create a [employer] password. You can change your password at any time. 7. Enter your Password Reset Question and Answer. This can be used at a later time if you forget your password. 8. Enter your e-mail address. You will receive e-mail notification when new information is available in [address] 9. Click Sign Up. You can now view and download portions of your medical record. 10. Click the Download Summary menu link to download a portable copy of your medical information. If you have questions, please visit the Frequently Asked Questions section of the [employer] website. Remember, [employer] is NOT to be used for urgent needs. For medical emergencies, dial 911. Now available from your iPhone and Android! Please provide this summary of care documentation to your next provider. If you have any questions after today's visit, please call [phone].

## 2017-12-12 NOTE — PROGRESS NOTES
HISTORY OF PRESENT ILLNESS  Royal ZOIE Scruggs is a 74 y.o. male    HPI: Mr. Darrell Scruggs  returns today for f/u of chronic low back pain. No h/o lumbar surgery. Several lumbar epidurals in the past with some temporary benefit. His last injection was last year. No interested in repeating injections. PT recently with good improvement.  for [employer]. Mr. Kanu Chase continues unchanged since last visit. He has been doing well with his current treatment plan but does report that his medication does not last longer than 4-6 hours. He does admit to using a few extra of his medication because of this issue. We did discuss possibly transitioning to long-acting medication. We had a lengthy conversation about long-acting versus short acting medication. At this time he would rather stay with short acting medication due to his job. 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 He he does not like the idea of 12 hour medication during his work schedule. I have agreed to adjust his hydrocodone up to 4 times daily on an as-needed basis only. He is in agreement with this plan. Extensive counseling was provided today about medication compliance. He understands that further medication shortages could result in discharge from our practice. I will have him follow-up in 3 months or sooner if needed    Current medication management consists of Hydrocodone 7.5/325 mg b.i.d. p.r.n., and Flexeril 10 mg once nightly as needed. Medications are helping with pain control and quality of life. His pain is 3/10 with medication and 7/10 without. Pt describes pain as aching. Aggravating factors include lying on his stomach, standing or sitting for too long. Relieved with rest, medication, lying down in fetal position, and avoiding painful activities. Current treatment is helping to improve general activity, mood, walking, sleep, enjoyment of life    He  is otherwise doing well with no other complaints today.  He denies any adverse events including nausea, vomiting, dizziness, constipation, hallucinations, or seizures. Education class 12/09/2015    Because the patient's current regimen places him/her at increased risk for possible overdose, a prescription for naloxone nasal spray has been provided. The patient understands that this medication is only to be used in the setting of a possible overdose and that inadvertent use of this medication could precipitate overt withdrawal.    POC UDS today. Confirmation pending. No Known Allergies    History reviewed. No pertinent surgical history. Review of Systems   Constitutional: Negative for chills and fever. HENT: Negative for congestion and sore throat. Eyes: Negative for blurred vision and double vision. Respiratory: Negative for cough, shortness of breath and wheezing. Cardiovascular: Negative for chest pain and palpitations. Gastrointestinal: Negative for constipation, heartburn and nausea. Genitourinary: Negative for dysuria and urgency. Musculoskeletal: Positive for back pain and joint pain ( right shoulder). Negative for neck pain. Skin: Negative for itching and rash. Neurological: Negative for dizziness, seizures and headaches. Endo/Heme/Allergies: Negative for environmental allergies. Does not bruise/bleed easily. Psychiatric/Behavioral: Negative for depression and suicidal ideas. The patient has insomnia. Physical Exam   Constitutional: He is oriented to person, place, and time and well-developed, well-nourished, and in no distress. No distress. HENT:   Head: Normocephalic and atraumatic. Eyes: EOM are normal.   Neck: Normal range of motion. Pulmonary/Chest: Effort normal.   Musculoskeletal: Normal range of motion. Neurological: He is alert and oriented to person, place, and time. He has normal reflexes. Skin: Skin is dry. No rash noted. No erythema.    Psychiatric: Mood, memory, affect and judgment normal.   Nursing note and vitals reviewed. ASSESSMENT:    1. Encounter for long-term (current) use of high-risk medication    2. Chronic left-sided low back pain without sciatica    3. Chronic pain syndrome    4. Spondylosis of lumbar region without myelopathy or radiculopathy    5. Facet arthritis of lumbar region [facility])    6. Lumbar foraminal stenosis         [place] Prescription Monitoring Program was reviewed which does not demonstrate aberrancies and/or inconsistencies with regard to the historical prescribing of controlled medications to this patient by other providers. PLAN / Pt Instructions:  1. Continue current plan with no evidence of addiction or diversion. Stable on current medication without adverse events. 2. Refill and adjust hydrocodone 7.5/325 mg up to 4 times daily as needed. 3. Continue Flexeril 10 mg once nightly as needed. 4. Continue home therapy. 5. Naloxone 4 mg nasal spray for opioid induced respiratory depression emergency only. 6. Discussed risks of addiction, dependency, and opioid induced hyperalgesia. 7. Return to clinic in 3 months    Medications Ordered Today   Medications    HYDROcodone-acetaminophen (NORCO) 7.5-325 mg per tablet     Sig: Take 1 Tab by mouth four (4) times daily as needed for Pain for up to 30 days. Max Daily Amount: 4 Tabs. Dispense:  120 Tab     Refill:  0    HYDROcodone-acetaminophen (NORCO) 7.5-325 mg per tablet     Sig: Take 1 Tab by mouth four (4) times daily as needed for Pain for up to 30 days. Max Daily Amount: 4 Tabs. Dispense:  120 Tab     Refill:  0    HYDROcodone-acetaminophen (NORCO) 7.5-325 mg per tablet     Sig: Take 1 Tab by mouth four (4) times daily as needed for Pain for up to 30 days. Max Daily Amount: 4 Tabs. Dispense:  120 Tab     Refill:  0       Pain medications prescribed with the objective of pain relief and improved physical and psychosocial function in this patient.     Spent 25 minutes with patient today reviewing the treatment plan, goals of treatment plan, and limitations of the treatment plan, to include the potential for side effects from medications and procedures. Cecilia Winter 12/12/2017      Note: Please excuse any typographical errors. Voice recognition software was used for this note and may cause mistakes.

## 2018-03-13 ENCOUNTER — OFFICE VISIT (OUTPATIENT)
Dept: PAIN MANAGEMENT | Age: 68
End: 2018-03-13

## 2018-03-13 VITALS
WEIGHT: 164 LBS | DIASTOLIC BLOOD PRESSURE: 72 MMHG | BODY MASS INDEX: 22.96 KG/M2 | RESPIRATION RATE: 16 BRPM | HEART RATE: 74 BPM | TEMPERATURE: 97.7 F | SYSTOLIC BLOOD PRESSURE: 116 MMHG | HEIGHT: 71 IN

## 2018-03-13 DIAGNOSIS — M54.50 CHRONIC LEFT-SIDED LOW BACK PAIN WITHOUT SCIATICA: ICD-10-CM

## 2018-03-13 DIAGNOSIS — M48.061 LUMBAR FORAMINAL STENOSIS: ICD-10-CM

## 2018-03-13 DIAGNOSIS — G89.29 CHRONIC LEFT-SIDED LOW BACK PAIN WITHOUT SCIATICA: ICD-10-CM

## 2018-03-13 DIAGNOSIS — M47.816 SPONDYLOSIS OF LUMBAR REGION WITHOUT MYELOPATHY OR RADICULOPATHY: ICD-10-CM

## 2018-03-13 DIAGNOSIS — M47.816 FACET ARTHRITIS OF LUMBAR REGION: ICD-10-CM

## 2018-03-13 DIAGNOSIS — G89.4 CHRONIC PAIN SYNDROME: ICD-10-CM

## 2018-03-13 RX ORDER — HYDROCODONE BITARTRATE AND ACETAMINOPHEN 7.5; 325 MG/1; MG/1
1 TABLET ORAL
Qty: 120 TAB | Refills: 0 | Status: SHIPPED | OUTPATIENT
Start: 2018-05-11 | End: 2018-06-12 | Stop reason: SDUPTHER

## 2018-03-13 RX ORDER — HYDROCODONE BITARTRATE AND ACETAMINOPHEN 7.5; 325 MG/1; MG/1
1 TABLET ORAL
Qty: 120 TAB | Refills: 0 | Status: SHIPPED | OUTPATIENT
Start: 2018-03-13 | End: 2018-06-12 | Stop reason: SDUPTHER

## 2018-03-13 RX ORDER — HYDROCODONE BITARTRATE AND ACETAMINOPHEN 7.5; 325 MG/1; MG/1
1 TABLET ORAL
Qty: 120 TAB | Refills: 0 | Status: SHIPPED | OUTPATIENT
Start: 2018-04-12 | End: 2018-06-12 | Stop reason: SDUPTHER

## 2018-03-13 NOTE — PROGRESS NOTES
HISTORY OF PRESENT ILLNESS  Royal ZOIE Rubio is a 79 y.o. male    HPI: Mr. Chrissie Rubio  returns today for f/u of chronic low back pain. No h/o lumbar surgery. Several lumbar epidurals in the past with some temporary benefit. His last injection was last year. No interested in repeating injections. PT recently with good improvement.  for [employer]. Mr. Bishop Minot continues unchanged since last visit. He continues to do well with his current treatment plan which has been offering significant pain control, but does report some worsening pain with the cold, severe weather. He is hoping for break in the weather soon. He is otherwise doing well with no other complaints today. I will have him follow-up in 3 months for further evaluation and recommendation per    Current medication management consists of Hydrocodone 7.5/325 mg b.i.d. p.r.n., and Flexeril 10 mg once nightly as needed. Medications are helping with pain control and quality of life. His pain is 3/10 with medication and 7/10 without. Pt describes pain as aching. Aggravating factors include lying on his stomach, standing or sitting for too long. Relieved with rest, medication, lying down in fetal position, and avoiding painful activities. Current treatment is helping to improve general activity, mood, walking, sleep, enjoyment of life    He  is otherwise doing well with no other complaints today. He denies any adverse events including nausea, vomiting, dizziness, constipation, hallucinations, or seizures. Education class 12/09/2015    Because the patient's current regimen places him/her at increased risk for possible overdose, a prescription for naloxone nasal spray has been provided. The patient understands that this medication is only to be used in the setting of a possible overdose and that inadvertent use of this medication could precipitate overt withdrawal.    POC UDS today. Confirmation pending.        No Known Allergies    History reviewed. No pertinent surgical history. Review of Systems   Constitutional: Negative for chills and fever. HENT: Negative for congestion and sore throat. Eyes: Negative for blurred vision and double vision. Respiratory: Negative for cough, shortness of breath and wheezing. Cardiovascular: Negative for chest pain and palpitations. Gastrointestinal: Negative for constipation, heartburn and nausea. Genitourinary: Negative for dysuria and urgency. Musculoskeletal: Positive for back pain and joint pain ( right shoulder). Negative for neck pain. Skin: Negative for itching and rash. Neurological: Negative for dizziness, seizures and headaches. Endo/Heme/Allergies: Negative for environmental allergies. Does not bruise/bleed easily. Psychiatric/Behavioral: Negative for depression and suicidal ideas. The patient has insomnia. Physical Exam   Constitutional: He is oriented to person, place, and time and well-developed, well-nourished, and in no distress. No distress. HENT:   Head: Normocephalic and atraumatic. Eyes: EOM are normal.   Neck: Normal range of motion. Pulmonary/Chest: Effort normal.   Musculoskeletal: Normal range of motion. Neurological: He is alert and oriented to person, place, and time. He has normal reflexes. Skin: Skin is dry. No rash noted. No erythema. Psychiatric: Mood, memory, affect and judgment normal.   Nursing note and vitals reviewed. ASSESSMENT:    1. Chronic left-sided low back pain without sciatica    2. Chronic pain syndrome    3. Spondylosis of lumbar region without myelopathy or radiculopathy    4. Facet arthritis of lumbar region ([facility] [address]    5. Lumbar foraminal stenosis         [place] Prescription Monitoring Program was reviewed which does not demonstrate aberrancies and/or inconsistencies with regard to the historical prescribing of controlled medications to this patient by other providers. PLAN / Pt Instructions:  1.  Continue current plan with no evidence of addiction or diversion. Stable on current medication without adverse events. 2. Refill and adjust hydrocodone 7.5/325 mg up to 4 times daily as needed. 3. Continue Flexeril 10 mg once nightly as needed. 4. Continue home therapy. 5. Naloxone 4 mg nasal spray for opioid induced respiratory depression emergency only. 6. Discussed risks of addiction, dependency, and opioid induced hyperalgesia. 7. Return to clinic in 3 months    Medications Ordered Today   Medications    HYDROcodone-acetaminophen (NORCO) 7.5-325 mg per tablet     Sig: Take 1 Tab by mouth four (4) times daily as needed for Pain for up to 30 days. Max Daily Amount: 4 Tabs. Dispense:  120 Tab     Refill:  0    HYDROcodone-acetaminophen (NORCO) 7.5-325 mg per tablet     Sig: Take 1 Tab by mouth four (4) times daily as needed for Pain for up to 30 days. Max Daily Amount: 4 Tabs. Dispense:  120 Tab     Refill:  0    HYDROcodone-acetaminophen (NORCO) 7.5-325 mg per tablet     Sig: Take 1 Tab by mouth four (4) times daily as needed for Pain for up to 30 days. Max Daily Amount: 4 Tabs. Dispense:  120 Tab     Refill:  0       Pain medications prescribed with the objective of pain relief and improved physical and psychosocial function in this patient. Spent 25 minutes with patient today reviewing the treatment plan, goals of treatment plan, and limitations of the treatment plan, to include the potential for side effects from medications and procedures. Cecilia Lundberg 3/13/2018      Note: Please excuse any typographical errors. Voice recognition software was used for this note and may cause mistakes.

## 2018-03-13 NOTE — PROGRESS NOTES
Nursing Notes    Patient presents to the office today in follow-up. Patient rates his pain at 6/10 on the numerical pain scale. Reviewed medications with counts as follows:    Rx Date filled Qty Dispensed Pill Count Last Dose Short   norco 7.5-325mg 02/12/18 120 5 today no                                          Comments:     POC UDS was not performed in office today    Any new labs or imaging since last appointment? NO    Have you been to an emergency room (ER) or urgent care clinic since your last visit? NO            Have you been hospitalized since your last visit? NO     If yes, where, when, and reason for visit? Have you seen or consulted any other health care providers outside of the [address] [place] [address]  since your last visit? NO     If yes, where, when, and reason for visit? HM deferred to pcp.

## 2018-03-13 NOTE — MR AVS SNAPSHOT
[address] [address] 
[phone] Patient: Jude Stephens MRN: [id number] SEX:[id number] Visit Information Date & Time Provider Department Dept. Phone Encounter #  
 3/13/2018  1:[id number] Follow-up Instructions Return in about 3 months (around 6/13/2018). Upcoming Health Maintenance Date Due Hepatitis C Screening 1950 DTaP/Tdap/Td series (1 - Tdap) 2/19/1973 FOBT Q 1 YEAR AGE 50-75 2/19/2002 ZOSTER VACCINE AGE 60> 12/19/2011 GLAUCOMA SCREENING Q2Y 2/19/2017 Pneumococcal 65+ Low/Medium Risk (1 of 2 - PCV13) 2/19/2017 MEDICARE YEARLY EXAM 2/19/2017 Influenza Age 5 to Adult 8/1/2017 Allergies as of 3/13/2018  Review Complete On: 3/13/2018 By: RAJWINDER Fernandez No Known Allergies Current Immunizations  Never Reviewed No immunizations on file. Not reviewed this visit You Were Diagnosed With   
  
 Codes Comments Chronic left-sided low back pain without sciatica     ICD-10-CM: M54.5, G89.29 ICD-9-CM: 724.2, 338.29 Chronic pain syndrome     ICD-10-CM: G89.4 ICD-9-CM: 338.4 Spondylosis of lumbar region without myelopathy or radiculopathy     ICD-10-CM: M47.816 ICD-9-CM: 721.3 Facet arthritis of lumbar region [facility])     ICD-10-CM: M46.96 
ICD-9-CM: 721.3 Lumbar foraminal stenosis     ICD-10-CM: M99.83 ICD-9-CM: 724.02 Vitals BP Pulse Temp Resp Height(growth percentile) Weight(growth percentile) 116/72 (BP 1 Location: Left arm, BP Patient Position: Sitting) 74 97.7 °F (36.5 °C) (Oral) 16 5' 11\" (1.803 m) 164 lb (74.4 kg) BMI Smoking Status 22.87 kg/m2 Former Smoker Vitals History BMI and BSA Data Body Mass Index Body Surface Area  
 22.87 kg/m 2 1.93 m 2 Preferred Pharmacy Pharmacy Name Phone [facility], [address] [place] [address] Dr [phone] Your Updated Medication List  
  
   
This list is accurate as of 3/13/18  1:49 PM.  Always use your most recent med list.  
  
  
  
  
 atorvastatin 20 mg tablet Commonly known as:  LIPITOR Take 10 mg by mouth daily. ASIF ASPIRIN PO Take  by mouth. CENTRUM SPECIALIST ENERGY PO Take  by mouth. FISH  mg Cap Generic drug:  Omega-3 Fatty Acids Take  by mouth.  
  
 * HYDROcodone-acetaminophen 7.5-325 mg per tablet Commonly known as:  Amy Gut Take 1 Tab by mouth four (4) times daily as needed for Pain for up to 30 days. Max Daily Amount: 4 Tabs. * HYDROcodone-acetaminophen 7.5-325 mg per tablet Commonly known as:  Amy Gut Take 1 Tab by mouth four (4) times daily as needed for Pain for up to 30 days. Max Daily Amount: 4 Tabs. Start taking on:  4/12/2018  
  
 * HYDROcodone-acetaminophen 7.5-325 mg per tablet Commonly known as:  Amy Gut Take 1 Tab by mouth four (4) times daily as needed for Pain for up to 30 days. Max Daily Amount: 4 Tabs. Start taking on:  5/11/2018  
  
 naloxone 4 mg/actuation nasal spray Commonly known as:  NARCAN  
1 [place] by IntraNASal route as needed. Indications: OPIATE-INDUCED RESPIRATORY DEPRESSION  
  
 VIAGRA 50 mg tablet Generic drug:  sildenafil citrate Take 50 mg by mouth as needed. * Notice: This list has 3 medication(s) that are the same as other medications prescribed for you. Read the directions carefully, and ask your doctor or other care provider to review them with you. Prescriptions Printed Refills HYDROcodone-acetaminophen (NORCO) 7.5-325 mg per tablet 0 Sig: Take 1 Tab by mouth four (4) times daily as needed for Pain for up to 30 days. Max Daily Amount: 4 Tabs. Class: Print  Route: Oral  
 HYDROcodone-acetaminophen (NORCO) 7.5-325 mg per tablet 0  
 Starting on: 4/12/2018 Sig: Take 1 Tab by mouth four (4) times daily as needed for Pain for up to 30 days. Max Daily Amount: 4 Tabs. Class: Print Route: Oral  
 HYDROcodone-acetaminophen (NORCO) 7.5-325 mg per tablet 0 Starting on: 5/11/2018 Sig: Take 1 Tab by mouth four (4) times daily as needed for Pain for up to 30 days. Max Daily Amount: 4 Tabs. Class: Print Route: Oral  
  
Follow-up Instructions Return in about 3 months (around 6/13/2018). Patient Instructions 1. Continue current plan with no evidence of addiction or diversion. Stable on current medication without adverse events. 2. Refill and adjust hydrocodone 7.5/325 mg up to 4 times daily as needed. 3. Continue Flexeril 10 mg once nightly as needed. 4. Continue home therapy. 5. Naloxone 4 mg nasal spray for opioid induced respiratory depression emergency only. 6. Discussed risks of addiction, dependency, and opioid induced hyperalgesia. 7. Return to clinic in 3 months Introducing [facility]! [employer] introduces [employer] patient portal. Now you can access parts of your medical record, email your doctor's office, and request medication refills online. 1. In your internet browser, go to [email or web address] 2. Click on the First Time User? Click Here link in the Sign In box. You will see the New Member Sign Up page. 3. Enter your R[employer] Access Code exactly as it appears below. You will not need to use this code after youve completed the sign-up process. If you do not sign up before the expiration date, you must request a new code. · [employer] Access Code: [email or web address] Expires: 6/11/2018  1:49 PM 
 
4. Enter the last four digits of your Social Security Number (xxxx) and Date of Birth (mm/dd/yyyy) as indicated and click Submit. You will be taken to the next sign-up page. 5. Create a Rackwise ID.  This will be your [employer] login ID and cannot be changed, so think of one that is secure and easy to remember. 6. Create a [employer] password. You can change your password at any time. 7. Enter your Password Reset Question and Answer. This can be used at a later time if you forget your password. 8. Enter your e-mail address. You will receive e-mail notification when new information is available in [address] 9. Click Sign Up. You can now view and download portions of your medical record. 10. Click the Download Summary menu link to download a portable copy of your medical information. If you have questions, please visit the Frequently Asked Questions section of the [employer] website. Remember, [employer] is NOT to be used for urgent needs. For medical emergencies, dial 911. Now available from your iPhone and Android! Please provide this summary of care documentation to your next provider. If you have any questions after today's visit, please call [phone].

## 2018-06-12 ENCOUNTER — OFFICE VISIT (OUTPATIENT)
Dept: PAIN MANAGEMENT | Age: 68
End: 2018-06-12

## 2018-06-12 VITALS
DIASTOLIC BLOOD PRESSURE: 80 MMHG | WEIGHT: 164 LBS | SYSTOLIC BLOOD PRESSURE: 140 MMHG | TEMPERATURE: 96.7 F | HEART RATE: 79 BPM | RESPIRATION RATE: 16 BRPM | BODY MASS INDEX: 22.96 KG/M2 | HEIGHT: 71 IN

## 2018-06-12 DIAGNOSIS — M48.061 LUMBAR FORAMINAL STENOSIS: ICD-10-CM

## 2018-06-12 DIAGNOSIS — Z79.899 ENCOUNTER FOR LONG-TERM (CURRENT) USE OF HIGH-RISK MEDICATION: Primary | ICD-10-CM

## 2018-06-12 DIAGNOSIS — M47.816 FACET ARTHRITIS OF LUMBAR REGION: ICD-10-CM

## 2018-06-12 DIAGNOSIS — M54.50 CHRONIC LEFT-SIDED LOW BACK PAIN WITHOUT SCIATICA: ICD-10-CM

## 2018-06-12 DIAGNOSIS — G89.4 CHRONIC PAIN SYNDROME: ICD-10-CM

## 2018-06-12 DIAGNOSIS — M47.816 SPONDYLOSIS OF LUMBAR REGION WITHOUT MYELOPATHY OR RADICULOPATHY: ICD-10-CM

## 2018-06-12 DIAGNOSIS — G89.29 CHRONIC LEFT-SIDED LOW BACK PAIN WITHOUT SCIATICA: ICD-10-CM

## 2018-06-12 LAB
ALCOHOL UR POC: NORMAL
AMPHETAMINES UR POC: NEGATIVE
BARBITURATES UR POC: NORMAL
BENZODIAZEPINES UR POC: NEGATIVE
BUPRENORPHINE UR POC: NEGATIVE
CANNABINOIDS UR POC: NEGATIVE
CARISOPRODOL UR POC: NORMAL
COCAINE UR POC: NEGATIVE
FENTANYL UR POC: NORMAL
MDMA/ECSTASY UR POC: NORMAL
METHADONE UR POC: NEGATIVE
METHAMPHETAMINE UR POC: NORMAL
METHYLPHENIDATE UR POC: NORMAL
OPIATES UR POC: NORMAL
OXYCODONE UR POC: NORMAL
PHENCYCLIDINE UR POC: NORMAL
PROPOXYPHENE UR POC: NORMAL
TRAMADOL UR POC: NORMAL
TRICYCLICS UR POC: NORMAL

## 2018-06-12 RX ORDER — HYDROCODONE BITARTRATE AND ACETAMINOPHEN 7.5; 325 MG/1; MG/1
1 TABLET ORAL
Qty: 120 TAB | Refills: 0 | Status: SHIPPED | OUTPATIENT
Start: 2018-07-11 | End: 2018-09-25 | Stop reason: SDUPTHER

## 2018-06-12 RX ORDER — HYDROCODONE BITARTRATE AND ACETAMINOPHEN 7.5; 325 MG/1; MG/1
1 TABLET ORAL
Qty: 120 TAB | Refills: 0 | Status: SHIPPED | OUTPATIENT
Start: 2018-08-10 | End: 2018-09-25 | Stop reason: SDUPTHER

## 2018-06-12 RX ORDER — HYDROCODONE BITARTRATE AND ACETAMINOPHEN 7.5; 325 MG/1; MG/1
TABLET ORAL
COMMUNITY
End: 2018-09-12 | Stop reason: SDUPTHER

## 2018-06-12 RX ORDER — HYDROCODONE BITARTRATE AND ACETAMINOPHEN 7.5; 325 MG/1; MG/1
1 TABLET ORAL
Qty: 120 TAB | Refills: 0 | Status: SHIPPED | OUTPATIENT
Start: 2018-06-12 | End: 2018-07-12

## 2018-06-12 RX ORDER — CYCLOBENZAPRINE HCL 10 MG
10 TABLET ORAL
Qty: 30 TAB | Refills: 5 | Status: SHIPPED | OUTPATIENT
Start: 2018-06-12 | End: 2018-12-26 | Stop reason: SDUPTHER

## 2018-06-12 NOTE — PROGRESS NOTES
HISTORY OF PRESENT ILLNESS  Royal ZOIE Jaime is a 79 y.o. male    HPI: Mr. Zeina Jaime  returns today for f/u of chronic low back pain. No h/o lumbar surgery. Several lumbar epidurals in the past with some temporary benefit. His last injection was last year. No interested in repeating injections. PT recently with good improvement.  for [employer]. Mr. Jolly Marino continues unchanged since last visit. He continues to do well with his current treatment plan which has been offering significant pain control. We had a lengthy conversation about the departure of his previous providers who recently left our practice. I explained to him that moving forward we will be focusing on a more conservative and non-opioid plan of care. This will result in changes to his treatment. He will continue with his current treatment plan for today but he understands that we will begin tapering his hydrocodone next visit. He is concerned about these changes but is open to further options. We discussed some options today and will discuss further moving forward. I will have him follow-up in 3 months for further evaluation and recommendation. Current medication management consists of Hydrocodone 7.5/325 mg b.i.d. p.r.n., and Flexeril 10 mg once nightly as needed. Medications are helping with pain control and quality of life. His pain is 3/10 with medication and 7/10 without. Pt describes pain as aching. Aggravating factors include lying on his stomach, standing or sitting for too long. Relieved with rest, medication, lying down in fetal position, and avoiding painful activities. Current treatment is helping to improve general activity, mood, walking, sleep, enjoyment of life    Mr. Zeina Jaime is tolerating medications well, with no side effects noted. He is able to stay more active with less discomfort with these current doses.  In the past 30 days, the patient reports an average of 60% pain relief with current treatment/medications. He is informed of side effects, risks, and benefits of this regimen, and emphasizes that he derives a significant improvement in functionality and quality of life, and notes that non-opioid medications and therapies in the past have not offered significant benefit. He  is otherwise doing well with no other complaints today. He denies any adverse events including nausea, vomiting, dizziness, constipation, hallucinations, or seizures. Because the patient's current regimen places him/her at increased risk for possible overdose, a prescription for naloxone nasal spray has been provided. The patient understands that this medication is only to be used in the setting of a possible overdose and that inadvertent use of this medication could precipitate overt withdrawal.    MME: 15  COMM: 6  OSWESTRY: 54 %   POC UDS today. Confirmation pending. Education class 12/09/2015     No Known Allergies    History reviewed. No pertinent surgical history. Review of Systems   Constitutional: Negative for chills and fever. HENT: Negative for congestion and sore throat. Eyes: Negative for blurred vision and double vision. Respiratory: Negative for cough, shortness of breath and wheezing. Cardiovascular: Negative for chest pain and palpitations. Gastrointestinal: Negative for constipation, heartburn and nausea. Genitourinary: Negative for dysuria and urgency. Musculoskeletal: Positive for back pain and joint pain ( right shoulder). Negative for neck pain. Skin: Negative for itching and rash. Neurological: Negative for dizziness, seizures and headaches. Endo/Heme/Allergies: Negative for environmental allergies. Does not bruise/bleed easily. Psychiatric/Behavioral: Negative for depression and suicidal ideas. The patient has insomnia. Physical Exam   Constitutional: He is oriented to person, place, and time and well-developed, well-nourished, and in no distress.  No distress. HENT:   Head: Normocephalic and atraumatic. Eyes: EOM are normal.   Neck: Normal range of motion. Pulmonary/Chest: Effort normal.   Musculoskeletal: Normal range of motion. Neurological: He is alert and oriented to person, place, and time. He has normal reflexes. Skin: Skin is dry. No rash noted. No erythema. Psychiatric: Mood, memory, affect and judgment normal.   Nursing note and vitals reviewed. ASSESSMENT:    1. Encounter for long-term (current) use of high-risk medication    2. Chronic left-sided low back pain without sciatica    3. Chronic pain syndrome    4. Spondylosis of lumbar region without myelopathy or radiculopathy    5. Facet arthritis of lumbar region [facility])    6. Lumbar foraminal stenosis         [place] Prescription Monitoring Program was reviewed which does not demonstrate aberrancies and/or inconsistencies with regard to the historical prescribing of controlled medications to this patient by other providers. PLAN / Pt Instructions:  1. Continue current plan with no evidence of addiction or diversion. Stable on current medication without adverse events. 2. Refill hydrocodone 7.5/325 mg up to 4 times daily as needed. 3. Refill Flexeril 10 mg once nightly as needed. 4. Continue home therapy. 5. Naloxone 4 mg nasal spray for opioid induced respiratory depression emergency only. 6. Discussed risks of addiction, dependency, and opioid induced hyperalgesia. Please remember to call at least 4-5 business days prior to your medication refill. Return to clinic in 3 months. Please call and cancel your appointment and pain management agreement if you do decide to transfer your care. Medications Ordered Today   Medications    HYDROcodone-acetaminophen (NORCO) 7.5-325 mg per tablet     Sig: Take 1 Tab by mouth four (4) times daily as needed for Pain for up to 30 days. Max Daily Amount: 4 Tabs.      Dispense:  120 Tab     Refill:  0    HYDROcodone-acetaminophen (NORCO) 7.5-325 mg per tablet     Sig: Take 1 Tab by mouth four (4) times daily as needed for Pain for up to 30 days. Max Daily Amount: 4 Tabs. Dispense:  120 Tab     Refill:  0    HYDROcodone-acetaminophen (NORCO) 7.5-325 mg per tablet     Sig: Take 1 Tab by mouth four (4) times daily as needed for Pain for up to 30 days. Max Daily Amount: 4 Tabs. Dispense:  120 Tab     Refill:  0    cyclobenzaprine (FLEXERIL) 10 mg tablet     Sig: Take 1 Tab by mouth nightly for 30 days. Dispense:  30 Tab     Refill:  5       DISPOSITION   Pain medications are prescribed with the objective of pain relief and improved physical and psychosocial function in this patient.  Patient has been counseled on proper use of prescribed medications.  Patient has been counseled about chronic medical conditions and their relationship to anxiety and depression and recommended mental health support as needed.  Reviewed with patient self-help tools, home exercise, and lifestyle changes to assist the patient in self-management of symptoms.  Reviewed with patient the treatment plan, goals of treatment plan, and limitations of treatment plan, to include the potential for side effects from medications and procedures. If side effects occur, it is the responsibility of the patient to inform the clinic so that a change in the treatment plan can be made in a safe manner. The patient is advised that stopping prescribed medication may cause an increase in symptoms and possible medication withdrawal symptoms. The patient is informed an emergency room evaluation may be necessary if this occurs. Spent 25 minutes with patient today which more than 50% of that time was spent on counseling and coordination of care. Cecilia Lee 6/12/2018      Note: Please excuse any typographical errors. Voice recognition software was used for this note and may cause mistakes.

## 2018-06-12 NOTE — PATIENT INSTRUCTIONS
1. Continue current plan with no evidence of addiction or diversion. Stable on current medication without adverse events. 2. Refill hydrocodone 7.5/325 mg up to 4 times daily as needed. 3. Refill Flexeril 10 mg once nightly as needed. 4. Continue home therapy. 5. Naloxone 4 mg nasal spray for opioid induced respiratory depression emergency only. 6. Discussed risks of addiction, dependency, and opioid induced hyperalgesia. Please remember to call at least 4-5 business days prior to your medication refill. Return to clinic in 3 months. Please call and cancel your appointment and pain management agreement if you do decide to transfer your care.

## 2018-06-12 NOTE — PROGRESS NOTES
Nursing Notes    Patient presents to the office today in follow-up. Patient rates his pain at 6/10 on the numerical pain scale. Reviewed medications with counts as follows:    Rx Date filled Qty Dispensed Pill Count Last Dose Short   Norco 7.5-325 mg tab [facility]/11/18 [facility] 90 0 today no                                          Comments:   Patient brought Norco 7.5-325 mg tab dated 6/13/17. The pt's COMM was completed and given to the provider for review. The pt scored an 6. POC UDS was performed in office today per verbal order and correct read back from provider. Any new labs or imaging since last appointment? NO    Have you been to an emergency room (ER) or urgent care clinic since your last visit? NO            Have you been hospitalized since your last visit? NO     If yes, where, when, and reason for visit? Have you seen or consulted any other health care providers outside of the [facility]  since your last visit? NO     If yes, where, when, and reason for visit? Mr. Loan Raymundo has a reminder for a \"due or due soon\" health maintenance. I have asked that he contact his primary care provider for follow-up on this health maintenance.

## 2018-07-16 ENCOUNTER — DOCUMENTATION ONLY (OUTPATIENT)
Dept: PAIN MANAGEMENT | Age: 68
End: 2018-07-16

## 2018-07-16 ENCOUNTER — TELEPHONE (OUTPATIENT)
Dept: PAIN MANAGEMENT | Age: 68
End: 2018-07-16

## 2018-07-16 NOTE — TELEPHONE ENCOUNTER
Royal ZOIE Lee has called requesting a refill of their controlled medication, Norco 7.5-325 mg, for the management of Chronic left-sided low back pain without sciatica. Last office visit date: 6/12/18    Date last  was pulled and reviewed : 7/16/18 and compliant. Last filled 6/12/18    Was the patient compliant when the above report was pulled? yes    Analgesia: Patient report 60% of pain relief with current medication regimen    Aberrancies: No aberrancies in the last 30 days. ADL's: Patient report he is able to do basic ADL's at home. Adverse Reaction:Patient report no adverse reaction. Provider's last note and plan of care reviewed? yes  Request forwarded to provider for review.

## 2018-08-12 ENCOUNTER — TELEPHONE (OUTPATIENT)
Dept: PAIN MANAGEMENT | Age: 68
End: 2018-08-12

## 2018-08-12 NOTE — TELEPHONE ENCOUNTER
Voice Mail: 18 Patient called regarding Prescription Refill:  1. Name and  given  2. Norco  3. Valid #  4. Pain Med Relief 60%  5. Yes  6.  No

## 2018-08-13 ENCOUNTER — DOCUMENTATION ONLY (OUTPATIENT)
Dept: PAIN MANAGEMENT | Age: 68
End: 2018-08-13

## 2018-08-13 NOTE — TELEPHONE ENCOUNTER
Royal ZOIE Mcgrath has called requesting a refill of their controlled medication, Norco 7.5-325 mg, for the management of Chronic left-sided low back pain without sciatica . Last office visit date: 6/12/18    Date last  was pulled and reviewed : 8/13/18 and compliant. Last filled 7/16/18    Was the patient compliant when the above report was pulled? yes    Analgesia: Patient report 60% of pain relief with current medication regimen    Aberrancies: No aberrancies in the last 30 days. ADL's: Patient report she is able to do basic ADL's at home. Adverse Reaction:Patient report no adverse reaction. Provider's last note and plan of care reviewed? yes  Request forwarded to provider for review. Provider was made aware.

## 2018-09-12 DIAGNOSIS — G89.4 CHRONIC PAIN SYNDROME: Primary | ICD-10-CM

## 2018-09-12 RX ORDER — HYDROCODONE BITARTRATE AND ACETAMINOPHEN 7.5; 325 MG/1; MG/1
1 TABLET ORAL
Qty: 120 TAB | Refills: 0 | Status: SHIPPED | OUTPATIENT
Start: 2018-09-12 | End: 2018-09-25 | Stop reason: SDUPTHER

## 2018-09-12 NOTE — TELEPHONE ENCOUNTER
Royal D Bradford Ahumada has called requesting a refill of their controlled medication, hydrocodone, for the management of chronic low back pain. Last office visit date: 6/12/18 with Derek Soto, next 9/25/18 Veverly Brush    Date last  was pulled and reviewed : 9/12/18 last filled 8/14/18    Was the patient compliant when the above report was pulled? yes    Analgesia: 60%    Aberrancies: none    ADL's: yes    Adverse Reaction: no    Provider's last note and plan of care reviewed? yes  Request forwarded to provider for review.

## 2018-09-25 ENCOUNTER — OFFICE VISIT (OUTPATIENT)
Dept: PAIN MANAGEMENT | Age: 68
End: 2018-09-25

## 2018-09-25 VITALS
HEIGHT: 71 IN | HEART RATE: 84 BPM | WEIGHT: 165 LBS | DIASTOLIC BLOOD PRESSURE: 90 MMHG | BODY MASS INDEX: 23.1 KG/M2 | SYSTOLIC BLOOD PRESSURE: 129 MMHG | TEMPERATURE: 98.2 F | RESPIRATION RATE: 14 BRPM

## 2018-09-25 DIAGNOSIS — M47.816 FACET ARTHRITIS OF LUMBAR REGION: ICD-10-CM

## 2018-09-25 DIAGNOSIS — M47.816 SPONDYLOSIS OF LUMBAR REGION WITHOUT MYELOPATHY OR RADICULOPATHY: ICD-10-CM

## 2018-09-25 DIAGNOSIS — G89.29 CHRONIC LEFT-SIDED LOW BACK PAIN WITHOUT SCIATICA: ICD-10-CM

## 2018-09-25 DIAGNOSIS — M54.50 CHRONIC LEFT-SIDED LOW BACK PAIN WITHOUT SCIATICA: ICD-10-CM

## 2018-09-25 DIAGNOSIS — M48.061 LUMBAR FORAMINAL STENOSIS: ICD-10-CM

## 2018-09-25 DIAGNOSIS — G89.4 CHRONIC PAIN SYNDROME: ICD-10-CM

## 2018-09-25 DIAGNOSIS — Z79.899 ENCOUNTER FOR LONG-TERM (CURRENT) USE OF HIGH-RISK MEDICATION: Primary | ICD-10-CM

## 2018-09-25 RX ORDER — HYDROCODONE BITARTRATE AND ACETAMINOPHEN 7.5; 325 MG/1; MG/1
1 TABLET ORAL
Qty: 100 TAB | Refills: 0 | Status: SHIPPED | OUTPATIENT
Start: 2018-12-09 | End: 2019-01-08

## 2018-09-25 RX ORDER — HYDROCODONE BITARTRATE AND ACETAMINOPHEN 7.5; 325 MG/1; MG/1
1 TABLET ORAL
Qty: 120 TAB | Refills: 0 | Status: SHIPPED | OUTPATIENT
Start: 2018-10-11 | End: 2018-11-10

## 2018-09-25 RX ORDER — HYDROCODONE BITARTRATE AND ACETAMINOPHEN 7.5; 325 MG/1; MG/1
1 TABLET ORAL
Qty: 110 TAB | Refills: 0 | Status: SHIPPED | OUTPATIENT
Start: 2018-11-10 | End: 2018-12-10

## 2018-09-25 NOTE — MR AVS SNAPSHOT
[address] [facility] [address] [phone] Patient: Mik Oconnor MRN: [id number] VQP:[id number] Visit Information Date & Time Provider Department Dept. Phone Encounter #  
 9/25/2018  1:00 PM Jessica Hatch, [address] [place] [address] for Pain Management [id number] Follow-up Instructions Return in about 3 months (around 12/25/2018). Upcoming Health Maintenance Date Due Hepatitis C Screening 1950 DTaP/Tdap/Td series (1 - Tdap) 2/19/1973 Shingrix Vaccine Age 50> (1 of 2) 2/19/2002 FOBT Q 1 YEAR AGE 50-75 2/19/2002 GLAUCOMA SCREENING Q2Y 2/19/2017 Pneumococcal 65+ Low/Medium Risk (1 of 2 - PCV13) 2/19/2017 MEDICARE YEARLY EXAM 3/14/2018 Influenza Age 5 to Adult 8/1/2018 Allergies as of 9/25/2018  Review Complete On: 9/25/2018 By: RAJWINDER Vincent No Known Allergies Current Immunizations  Never Reviewed No immunizations on file. Not reviewed this visit You Were Diagnosed With   
  
 Codes Comments Encounter for long-term (current) use of high-risk medication    -  Primary ICD-10-CM: T55.692 ICD-9-CM: V58.69 Chronic pain syndrome     ICD-10-CM: G89.4 ICD-9-CM: 338. 4 Chronic left-sided low back pain without sciatica     ICD-10-CM: M54.5, G89.29 ICD-9-CM: 724.2, 338.29 Spondylosis of lumbar region without myelopathy or radiculopathy     ICD-10-CM: M47.816 ICD-9-CM: 721.3 Facet arthritis of lumbar region [facility])     ICD-10-CM: M46.96 
ICD-9-CM: 721.3 Lumbar foraminal stenosis     ICD-10-CM: M99.83 ICD-9-CM: 724.02 Vitals BP Pulse Temp Resp Height(growth percentile) Weight(growth percentile) 129/90 (BP 1 Location: Left arm, BP Patient Position: Sitting) 84 98.2 °F (36.8 °C) (Oral) 14 5' 11\" (1.803 m) 165 lb (74.8 kg) BMI Smoking Status 23.01 kg/m2 Former Smoker Vitals History BMI and BSA Data Body Mass Index Body Surface Area 23.01 kg/m 2 1.94 m 2 Preferred Pharmacy Pharmacy Name Phone [address] [facility] [address], [address] [place] [address]  [phone] Your Updated Medication List  
  
   
This list is accurate as of 9/25/18  1:58 PM.  Always use your most recent med list.  
  
  
  
  
 atorvastatin 20 mg tablet Commonly known as:  LIPITOR Take 10 mg by mouth daily. ASIF ASPIRIN PO Take  by mouth. CENTRUM SPECIALIST ENERGY PO Take  by mouth. FISH  mg Cap Generic drug:  Omega-3 Fatty Acids Take  by mouth.  
  
 * HYDROcodone-acetaminophen 7.5-325 mg per tablet Commonly known as:  Osmin Bent Take 1 Tab by mouth four (4) times daily as needed for Pain for up to 30 days. Max Daily Amount: 4 Tabs. Start taking on:  10/11/2018  
  
 * HYDROcodone-acetaminophen 7.5-325 mg per tablet Commonly known as:  Osmin Bent Take 1 Tab by mouth four (4) times daily as needed for Pain for up to 30 days. Max Daily Amount: 4 Tabs. No more than 110/month Start taking on:  11/10/2018  
  
 * HYDROcodone-acetaminophen 7.5-325 mg per tablet Commonly known as:  Osmin Bent Take 1 Tab by mouth four (4) times daily as needed for Pain for up to 30 days. Max Daily Amount: 4 Tabs. No more than 100/month Start taking on:  12/9/2018  
  
 naloxone 4 mg/actuation nasal spray Commonly known as:  NARCAN  
1 Lyman by IntraNASal route as needed. Indications: OPIATE-INDUCED RESPIRATORY DEPRESSION  
  
 VIAGRA 50 mg tablet Generic drug:  sildenafil citrate Take 50 mg by mouth as needed. * Notice: This list has 3 medication(s) that are the same as other medications prescribed for you. Read the directions carefully, and ask your doctor or other care provider to review them with you. Prescriptions Printed Refills  HYDROcodone-acetaminophen (NORCO) 7.5-325 mg per tablet 0  
 Starting on: 10/11/2018 Sig: Take 1 Tab by mouth four (4) times daily as needed for Pain for up to 30 days. Max Daily Amount: 4 Tabs. Class: Print Route: Oral  
 HYDROcodone-acetaminophen (NORCO) 7.5-325 mg per tablet 0 Starting on: 11/10/2018 Sig: Take 1 Tab by mouth four (4) times daily as needed for Pain for up to 30 days. Max Daily Amount: 4 Tabs. No more than 110/month Class: Print Route: Oral  
 HYDROcodone-acetaminophen (NORCO) 7.5-325 mg per tablet 0 Starting on: 12/9/2018 Sig: Take 1 Tab by mouth four (4) times daily as needed for Pain for up to 30 days. Max Daily Amount: 4 Tabs. No more than 100/month Class: Print Route: Oral  
  
We Performed the Following AMB POC DRUG SCREEN () [ [facility]] DRUG SCREEN [[id number] Custom] Follow-up Instructions Return in about 3 months (around 12/25/2018). Patient Instructions 1. Modify current plan with no evidence of addiction or diversion. Stable on current medication without adverse events. 2. Refill and adjust hydrocodone 7.5/325 mg up to 4 times daily as needed times 1 month, then adjust down to 34 times daily as needed no more than 110/month x 1 month, then adjust down to no more than 100/month until next visit 3. Continue Flexeril 10 mg once nightly as needed. 5 refills 4. Continue home therapy. 5. Naloxone 4 mg nasal spray for opioid induced respiratory depression emergency only. 6. Discussed risks of addiction, dependency, and opioid induced hyperalgesia. Please remember to call at least 5 business days prior to your medication refill. Return to clinic in 3 months. Please call and cancel your appointment and pain management agreement if you do decide to transfer your care. Introducing [facility]! Munir Ferraro introduces [employer] patient portal. Now you can access parts of your medical record, email your doctor's office, and request medication refills online. 1. In your internet browser, go to [email or web address] 2. Click on the First Time User? Click Here link in the Sign In box. You will see the New Member Sign Up page. 3. Enter your [employer] Access Code exactly as it appears below. You will not need to use this code after youve completed the sign-up process. If you do not sign up before the expiration date, you must request a new code. · [employer] Access Code: [email or web address] Expires: 12/24/2018  1:56 PM 
 
4. Enter the last four digits of your Social Security Number (xxxx) and Date of Birth (mm/dd/yyyy) as indicated and click Submit. You will be taken to the next sign-up page. 5. Create a impokt ID. This will be your [employer] login ID and cannot be changed, so think of one that is secure and easy to remember. 6. Create a [employer] password. You can change your password at any time. 7. Enter your Password Reset Question and Answer. This can be used at a later time if you forget your password. 8. Enter your e-mail address. You will receive e-mail notification when new information is available in [address] 9. Click Sign Up. You can now view and download portions of your medical record. 10. Click the Download Summary menu link to download a portable copy of your medical information. If you have questions, please visit the Frequently Asked Questions section of the [employer] website. Remember, [employer] is NOT to be used for urgent needs. For medical emergencies, dial 911. Now available from your iPhone and Android! Please provide this summary of care documentation to your next provider. If you have any questions after today's visit, please call [phone].

## 2018-09-25 NOTE — PATIENT INSTRUCTIONS
1. Modify current plan with no evidence of addiction or diversion. Stable on current medication without adverse events. 2. Refill and adjust hydrocodone 7.5/325 mg up to 4 times daily as needed times 1 month, then adjust down to 3-4 times daily as needed no more than 110/month x 1 month, then adjust down to no more than 100/month until next visit  3. Continue Flexeril 10 mg once nightly as needed. 5 refills  4. Continue home therapy. 5. Naloxone 4 mg nasal spray for opioid induced respiratory depression emergency only. 6. Discussed risks of addiction, dependency, and opioid induced hyperalgesia. Please remember to call at least 5 business days prior to your medication refill. Return to clinic in 3 months. Please call and cancel your appointment and pain management agreement if you do decide to transfer your care.

## 2018-09-25 NOTE — PROGRESS NOTES
HISTORY OF PRESENT ILLNESS  Royal LINO Clair Chandra is a 79 y.o. male    HPI: Mr. Clair Chandra  returns today for f/u of chronic low back pain. No h/o lumbar surgery. Several lumbar epidurals in the past with some temporary benefit. His last injection was last year. No interested in repeating injections. PT recently with good improvement.  for Path. Mr. Aleida Caldera continues unchanged since last visit. He reports that he has been doing well with his current treatment plan which has continued to offer significant pain control. He is also started a new job and doing well so far. We did have a lengthy conversation previously regarding changes to our practice. We will begin tapering his hydrocodone as previously discussed. I have explained to him that our practice will continue to work on a conservative and comprehensive plan of care to help control his pain. He is concerned about reducing his medications and has expressed some interest in transferring his care but has not made a formal decision at this time. I have asked that he call and cancel his appointment and pain management agreement if he does decide to transfer his care. Current medication management consists of Hydrocodone 7.5/325 mg QID p.r.n., and Flexeril 10 mg once nightly as needed. Medications are helping with pain control and quality of life. His pain is 3/10 with medication and 7/10 without. Pt describes pain as aching. Aggravating factors include lying on his stomach, standing or sitting for too long. Relieved with rest, medication, lying down in fetal position, and avoiding painful activities. Current treatment is helping to improve general activity, mood, walking, sleep, enjoyment of life    Mr. Clair Chandra is tolerating medications well, with no side effects noted. He is able to stay more active with less discomfort with these current doses.  In the past 30 days, the patient reports an average of 60% pain relief with current treatment/medications. He is informed of side effects, risks, and benefits of this regimen, and emphasizes that he derives a significant improvement in functionality and quality of life, and notes that non-opioid medications and therapies in the past have not offered significant benefit. He  is otherwise doing well with no other complaints today. He denies any adverse events including nausea, vomiting, dizziness, constipation, hallucinations, or seizures. Because the patient's current regimen places him/her at increased risk for possible overdose, a prescription for naloxone nasal spray has been provided. The patient understands that this medication is only to be used in the setting of a possible overdose and that inadvertent use of this medication could precipitate overt withdrawal.    MME: 15   COMM: 6  OSWESTRY: 31 %   POC UDS today. Confirmation pending. Education class 12/09/2015     No Known Allergies    History reviewed. No pertinent surgical history. Review of Systems   Constitutional: Negative for chills and fever. HENT: Negative for congestion and sore throat. Eyes: Negative for blurred vision and double vision. Respiratory: Negative for cough, shortness of breath and wheezing. Cardiovascular: Negative for chest pain and palpitations. Gastrointestinal: Negative for constipation, heartburn and nausea. Genitourinary: Negative for dysuria and urgency. Musculoskeletal: Positive for back pain and joint pain ( right shoulder). Negative for neck pain. Skin: Negative for itching and rash. Neurological: Negative for dizziness, seizures and headaches. Endo/Heme/Allergies: Negative for environmental allergies. Does not bruise/bleed easily. Psychiatric/Behavioral: Negative for depression and suicidal ideas. The patient has insomnia. Physical Exam   Constitutional: He is oriented to person, place, and time and well-developed, well-nourished, and in no distress.  No distress. HENT:   Head: Normocephalic and atraumatic. Eyes: EOM are normal.   Neck: Normal range of motion. Pulmonary/Chest: Effort normal.   Musculoskeletal: Normal range of motion. Neurological: He is alert and oriented to person, place, and time. He has normal reflexes. Skin: Skin is dry. No rash noted. No erythema. Psychiatric: Mood, memory, affect and judgment normal.   Nursing note and vitals reviewed. ASSESSMENT:    1. Encounter for long-term (current) use of high-risk medication    2. Chronic pain syndrome    3. Chronic left-sided low back pain without sciatica    4. Spondylosis of lumbar region without myelopathy or radiculopathy    5. Facet arthritis of lumbar region [facility])    6. Lumbar foraminal stenosis         [place] Prescription Monitoring Program was reviewed which does not demonstrate aberrancies and/or inconsistencies with regard to the historical prescribing of controlled medications to this patient by other providers. PLAN / Pt Instructions:  1. Modify current plan with no evidence of addiction or diversion. Stable on current medication without adverse events. 2. Refill and adjust hydrocodone 7.5/325 mg up to 4 times daily as needed times 1 month, then adjust down to 3-4 times daily as needed no more than 110/month x 1 month, then adjust down to no more than 100/month until next visit  3. Continue Flexeril 10 mg once nightly as needed. 5 refills  4. Continue home therapy. 5. Naloxone 4 mg nasal spray for opioid induced respiratory depression emergency only. 6. Discussed risks of addiction, dependency, and opioid induced hyperalgesia. Please remember to call at least 5 business days prior to your medication refill. Return to clinic in 3 months. Please call and cancel your appointment and pain management agreement if you do decide to transfer your care.       Medications Ordered Today   Medications    HYDROcodone-acetaminophen (NORCO) 7.5-325 mg per tablet     Sig: Take 1 Tab by mouth four (4) times daily as needed for Pain for up to 30 days. Max Daily Amount: 4 Tabs. Dispense:  120 Tab     Refill:  0    HYDROcodone-acetaminophen (NORCO) 7.5-325 mg per tablet     Sig: Take 1 Tab by mouth four (4) times daily as needed for Pain for up to 30 days. Max Daily Amount: 4 Tabs. No more than 110/month     Dispense:  110 Tab     Refill:  0    HYDROcodone-acetaminophen (NORCO) 7.5-325 mg per tablet     Sig: Take 1 Tab by mouth four (4) times daily as needed for Pain for up to 30 days. Max Daily Amount: 4 Tabs. No more than 100/month     Dispense:  100 Tab     Refill:  0       DISPOSITION   Pain medications are prescribed with the objective of pain relief and improved physical and psychosocial function in this patient.  Patient has been counseled on proper use of prescribed medications.  Patient has been counseled about chronic medical conditions and their relationship to anxiety and depression and recommended mental health support as needed.  Reviewed with patient self-help tools, home exercise, and lifestyle changes to assist the patient in self-management of symptoms.  Reviewed with patient the treatment plan, goals of treatment plan, and limitations of treatment plan, to include the potential for side effects from medications and procedures. If side effects occur, it is the responsibility of the patient to inform the clinic so that a change in the treatment plan can be made in a safe manner. The patient is advised that stopping prescribed medication may cause an increase in symptoms and possible medication withdrawal symptoms. The patient is informed an emergency room evaluation may be necessary if this occurs. Spent 25 minutes with patient today which more than 50% of that time was spent on counseling and coordination of care. Aníbal Menendez, [address] 9/25/2018      Note: Please excuse any typographical errors.  Voice recognition software was used for this note and may cause mistakes.

## 2018-09-25 NOTE — PROGRESS NOTES
Nursing Notes    Patient presents to the office today in follow-up. Patient rates his pain at 4/10 on the numerical pain scale. Reviewed medications with counts as follows:    Rx Date filled Qty Dispensed Pill Count Last Dose Short   Hydrocodone 7.5-325 mg 9/12/18 120 62 today no                                        Last opioid agreement 12/12/17  Last urine drug screen 1/20/18   PHQ over the last two weeks 9/25/2018   Little interest or pleasure in doing things Not at all   Feeling down, depressed, irritable, or hopeless Not at all   Total Score PHQ 2 0       Comments:     POC UDS was performed in office today    Any new labs or imaging since last appointment? NO    Have you been to an emergency room (ER) or urgent care clinic since your last visit? NO            Have you been hospitalized since your last visit? NO     If yes, where, when, and reason for visit? Have you seen or consulted any other health care providers outside of the [address] [place] [address]  since your last visit? NO     If yes, where, when, and reason for visit? Mr. Bradford Ahumada has a reminder for a \"due or due soon\" health maintenance. I have asked that he contact his primary care provider for follow-up on this health maintenance.

## 2018-11-06 ENCOUNTER — TELEPHONE (OUTPATIENT)
Dept: PAIN MANAGEMENT | Age: 68
End: 2018-11-06

## 2018-11-06 NOTE — TELEPHONE ENCOUNTER
Royal ZOIE Menchaca has called requesting a refill of their controlled medication, hydrocodone, for the management of back pain. Last office visit date: 9/25/18 with Mere Jain, next 12/19/18 with Jose    Date last  was pulled and reviewed : 11/6/18 last filled 10/11/18    Was the patient compliant when the above report was pulled? yes    Analgesia: 50%    Aberrancies: none    ADL's: yes    Adverse Reaction: no    Provider's last note and plan of care reviewed? yes  Request forwarded to provider for review. Contract 1/2/18   UDS  1/20/18                                            prescriptions pre-printed by provider .

## 2018-11-06 NOTE — TELEPHONE ENCOUNTER
Patient called about medication refill(Vicodin)     50%  Yes  No    Please give patient a call [phone]

## 2018-11-07 NOTE — TELEPHONE ENCOUNTER
Patient with multiple deviations from the opioid care agreement over the past 18 months. Continue opioid wean. Please obtain updated urine drug screen and note the point-of-care result prior to distributing prescription. Afterwards will be okay to distribute the prescription.

## 2018-11-12 ENCOUNTER — TELEPHONE (OUTPATIENT)
Dept: PAIN MANAGEMENT | Age: 68
End: 2018-11-12

## 2018-11-12 NOTE — TELEPHONE ENCOUNTER
Patient called the nurse triage line on 11/8/18 returning our phone call. Attempted to contact patient to inform them their prescription was ready for  but patient did not answer the phone, and had to leave a v/m for him to call the office back. Clinic number provided.

## 2018-11-21 NOTE — TELEPHONE ENCOUNTER
Royal ZOIE Henry has called requesting a refill of their controlled medication, norco, for the management of his chronic back pain. Last office visit date: 09/25/18  Last opioid care agreement 12/12/17  Last UDS was done 09/25/18    Date last  was pulled and reviewed : 11/21/18, last fill date for norco was 10/11/18    Was the patient compliant when the above report was pulled? yes    Analgesia: pt reports a 50-60% pain relief with his current opioid medication regimen     Aberrancies: none noted     ADL's: pt reports that he is able to perform his normal daily tasks because he is taking his medication. Adverse Reaction: none reported by the pt at this time. Provider's last note and plan of care reviewed? Yes, pre-printed prescription  Request forwarded to provider for review.

## 2018-11-23 ENCOUNTER — OFFICE VISIT (OUTPATIENT)
Dept: PAIN MANAGEMENT | Age: 68
End: 2018-11-23

## 2018-11-23 DIAGNOSIS — Z79.899 ENCOUNTER FOR LONG-TERM (CURRENT) USE OF HIGH-RISK MEDICATION: Primary | ICD-10-CM

## 2018-11-23 LAB
ALCOHOL UR POC: NORMAL
AMPHETAMINES UR POC: NEGATIVE
BARBITURATES UR POC: NEGATIVE
BENZODIAZEPINES UR POC: NEGATIVE
BUPRENORPHINE UR POC: NEGATIVE
CANNABINOIDS UR POC: NORMAL
CARISOPRODOL UR POC: NORMAL
COCAINE UR POC: NEGATIVE
FENTANYL UR POC: NORMAL
MDMA/ECSTASY UR POC: NEGATIVE
METHADONE UR POC: NEGATIVE
METHAMPHETAMINE UR POC: NEGATIVE
METHYLPHENIDATE UR POC: NORMAL
OPIATES UR POC: NEGATIVE
OXYCODONE UR POC: NEGATIVE
PHENCYCLIDINE UR POC: NORMAL
PROPOXYPHENE UR POC: NORMAL
TRAMADOL UR POC: NORMAL
TRICYCLICS UR POC: NEGATIVE

## 2018-11-26 NOTE — PROGRESS NOTES
Point-of-care drug screen significant for positive cannabis result. Review when confirmatory result is available. Review results with patient at next office visit.

## 2018-12-17 ENCOUNTER — TELEPHONE (OUTPATIENT)
Dept: PAIN MANAGEMENT | Age: 68
End: 2018-12-17

## 2018-12-17 NOTE — TELEPHONE ENCOUNTER
11:51 am I M for patient to call back.  This call was regarding the class that he is to attend on 12/21/2018 at 10:00 at [facility]

## 2018-12-26 ENCOUNTER — OFFICE VISIT (OUTPATIENT)
Dept: PAIN MANAGEMENT | Age: 68
End: 2018-12-26

## 2018-12-26 VITALS
SYSTOLIC BLOOD PRESSURE: 133 MMHG | OXYGEN SATURATION: 98 % | BODY MASS INDEX: 23.1 KG/M2 | DIASTOLIC BLOOD PRESSURE: 78 MMHG | RESPIRATION RATE: 14 BRPM | HEIGHT: 71 IN | TEMPERATURE: 97.7 F | HEART RATE: 83 BPM | WEIGHT: 165 LBS

## 2018-12-26 DIAGNOSIS — M54.50 CHRONIC LEFT-SIDED LOW BACK PAIN WITHOUT SCIATICA: ICD-10-CM

## 2018-12-26 DIAGNOSIS — M47.816 FACET ARTHRITIS OF LUMBAR REGION: ICD-10-CM

## 2018-12-26 DIAGNOSIS — M48.061 LUMBAR FORAMINAL STENOSIS: ICD-10-CM

## 2018-12-26 DIAGNOSIS — G89.4 CHRONIC PAIN SYNDROME: ICD-10-CM

## 2018-12-26 DIAGNOSIS — M47.816 SPONDYLOSIS OF LUMBAR REGION WITHOUT MYELOPATHY OR RADICULOPATHY: ICD-10-CM

## 2018-12-26 DIAGNOSIS — Z79.899 ENCOUNTER FOR LONG-TERM (CURRENT) USE OF HIGH-RISK MEDICATION: Primary | ICD-10-CM

## 2018-12-26 DIAGNOSIS — G89.29 CHRONIC LEFT-SIDED LOW BACK PAIN WITHOUT SCIATICA: ICD-10-CM

## 2018-12-26 RX ORDER — CYCLOBENZAPRINE HCL 10 MG
10 TABLET ORAL
Qty: 30 TAB | Refills: 2 | Status: SHIPPED | OUTPATIENT
Start: 2018-12-26 | End: 2019-06-25 | Stop reason: SDUPTHER

## 2018-12-26 NOTE — PROGRESS NOTES
Nursing Notes    Patient presents to the office today in follow-up. Patient rates his pain at 6/10 on the numerical pain scale. Reviewed medications with counts as follows:    Rx Date filled Qty Dispensed Pill Count Last Dose Short   Norco 7.5 mg 11/23/18 110 0 12/23/18 no     :  Last opioid agreement 12/26/18  Last urine drug screen 11/23/18    Comments:  Patient is here today for a follow up appt today he states his pain level today is a 6  PHQ 2 was done patient denies any depression. POC UDS was not performed in office today    Any new labs or imaging since last appointment? NO    Have you been to an emergency room (ER) or urgent care clinic since your last visit? NO            Have you been hospitalized since your last visit? NO     If yes, where, when, and reason for visit? Have you seen or consulted any other health care providers outside of the [address] [place] [address]  since your last visit? NO     If yes, where, when, and reason for visit? Mr. Rodger Roberson has a reminder for a \"due or due soon\" health maintenance. I have asked that he contact his primary care provider for follow-up on this health maintenance.

## 2018-12-26 NOTE — PATIENT INSTRUCTIONS
Safe Use of Opioid Pain Medicine: Care Instructions  Your Care Instructions  Pain is your body's way of warning you that something is wrong. Pain feels different for everybody. Only you can describe your pain. A doctor can suggest or prescribe many types of medicines for pain. These range from over-the-counter medicines like acetaminophen (Tylenol) to powerful medicines called opioids. Examples of opioids are fentanyl, hydrocodone, morphine, and oxycodone. Heroin is an illegal opioid  Opioids are strong medicines. They can help you manage pain when you use them the right way. But if you misuse them, they can cause serious harm and even death. For these reasons, doctors are very careful about how they prescribe opioids. If you decide to take opioids, here are some things to remember. · Keep your doctor informed. You can get addicted to opioids. The risk is higher if you have a history of substance use. Your doctor will monitor you closely for signs of misuse and addiction and to figure out when you no longer need to take opioids. · Make a treatment plan. The goal of your plan is to be able to function and do the things you need to do, even if you still have some pain. You might be able to manage your pain with other non-opioid options like physical therapy, relaxation, or over-the-counter pain medicines. · Be aware of the side effects. Opioids can cause serious side effects, such as constipation, dry mouth, and nausea. And over time, you may need a higher dose to get pain relief. This is called tolerance. Your body also gets used to opioids. This is called physical dependence. If you suddenly stop taking them, you may have withdrawal symptoms. The doctor carefully considered what pain medicine is right for you. You may not have received opioids if your doctor was concerned about drug interactions or your safety, or if he or she had other concerns. It is best to have one doctor or clinic treat your pain. This way you will get the pain medicine that will help you the most. And a doctor will be able to watch for any problems that the medicine might cause. The doctor has checked you carefully, but problems can develop later. If you notice any problems or new symptoms,  get medical treatment right away. Follow-up care is a key part of your treatment and safety. Be sure to make and go to all appointments, and call your doctor if you are having problems. It's also a good idea to know your test results and keep a list of the medicines you take. How can you care for yourself at home? · If you need to take opioids to manage your pain, remember these safety tips. ? Follow directions carefully. It's easy to misuse opioids if you take a dose other than what's prescribed by your doctor. This can lead to overdose and even death. Even sharing them with someone they weren't meant for is misuse. ? Be cautious. Opioids may affect your judgment and decision making. Do not drive or operate machinery until you can think clearly. Talk with your doctor about when it is safe to drive. ? Reduce the risk of drug interactions. Opioids can be dangerous if you take them with alcohol or with certain drugs like sleeping pills and muscle relaxers. Make sure your doctor knows about all the other medicines you take, including over-the-counter medicines. Don't start any new medicines before you talk to your doctor or pharmacist.  ? Keep others safe. Store opioids in a safe and secure place. Make sure that pets, children, friends, and family can't get to them. When you're done using opioids, make sure to properly dispose of them. You can either use a community drug take-back program or your drugstore's mail-back program. If one of these programs isn't available, you can flush opioid skin patches and unused opioid pills down the toilet. ? Reduce the risk of overdose. Misuse of opioids can be very dangerous.  Protect yourself by asking your doctor about a naloxone rescue kit. It can help you--and even save your life--if you take too much of an opioid. · Try other ways to reduce pain. ? Relax, and reduce stress. Relaxation techniques such as deep breathing or meditation can help. ? Keep moving. Gentle, daily exercise can help reduce pain over the long run. Try low- or no-impact exercises such as walking, swimming, and stationary biking. Do stretches to stay flexible. ? Try heat, cold packs, and massage. ? Get enough sleep. Pain can make you tired and drain your energy. Talk with your doctor if you have trouble sleeping because of pain. ? Think positive. Your thoughts can affect your pain level. Do things that you enjoy to distract yourself when you have pain instead of focusing on the pain. See a movie, read a book, listen to music, or spend time with a friend. · If you are not taking a prescription pain medicine, ask your doctor if you can take an over-the-counter medicine. When should you call for help? Call your doctor now or seek immediate medical care if:    · You have a new kind of pain.     · You have new symptoms, such as a fever or rash, along with the pain.    Watch closely for changes in your health, and be sure to contact your doctor if:    · You think you might be using too much pain medicine, and you need help to use less or stop.     · Your pain gets worse.     · You would like a referral to a doctor or clinic that specializes in pain management. Where can you learn more? Go to http://harriett-joana.info/. Enter R108 in the search box to learn more about \"Safe Use of Opioid Pain Medicine: Care Instructions. \"  Current as of: September 10, 2017  Content Version: 11.8  © 3609-6893 [employer]. Care instructions adapted under license by [employer] (which disclaims liability or warranty for this information).  If you have questions about a medical condition or this instruction, always ask your healthcare professional. [place] [address] any warranty or liability for your use of this information.

## 2018-12-31 RX ORDER — NALOXONE HYDROCHLORIDE 2 MG/.4ML
2 INJECTION, SOLUTION INTRAMUSCULAR; SUBCUTANEOUS
Qty: 1 DEVICE | Refills: 0 | Status: SHIPPED | OUTPATIENT
Start: 2018-12-31 | End: 2018-12-31

## 2018-12-31 NOTE — PROGRESS NOTES
Referral date 11/10/15, source Dr Theodora Valle and for LBP. HPI:  Royal ZOIE Yancey is a 79 y.o. male here for f/u visit for ongoing evaluation of chronic lower back pain. No history of lumbar surgery. Pt was last seen here on 9/25/18. Pt denies interval changes on the character or distribution of pain. Pain is located lumbar region and described as stabbing. Pain at its best is 5/10. Pain at its worse is 10/10. The pain is worsened by lifting, prolonged sitting, prolonged standing and prolonged walking. Symptoms are improved byPT in the past, heating pad, massage temporarily, Flexeril, yoga temporarily, Norco and lumbar epidural steroid injection. Pt has tried NSAIDs, tylenol, OTC topical medications, Cymbalta and Lyrica with no perceived benefit. He has never tried TENS unit, aquatic PT, acupuncture, chiropractor, Gabapentin, TCA or Topamax. Since last visit, he reports not being able to get through the clinics phone line and therefore could not get his refill prescription. He told some old Tramadol he had because he \"needed something for pain\". Social History     Socioeconomic History    Marital status: UNKNOWN     Spouse name: Not on file    Number of children: Not on file    Years of education: Not on file    Highest education level: Not on file   Social Needs    Financial resource strain: Not on file    Food insecurity - worry: Not on file    Food insecurity - inability: Not on file   [employer] needs - medical: Not on file   [employer] needs - non-medical: Not on file   Occupational History    Not on file   Tobacco Use    Smoking status: Former Smoker    Smokeless tobacco: Never Used   Substance and Sexual Activity    Alcohol use:  Yes    Drug use: No     Comment: hx of cocaine \"1972\" and thc \"2014\"    Sexual activity: Not on file   Other Topics Concern    Not on file   Social History Narrative    Not on file     Family History   Problem Relation Age of Onset    Hypertension Mother     Seizures Mother     Heart Attack Father      No Known Allergies  Past Medical History:   Diagnosis Date    Essential hypertension      No past surgical history on file. Current Outpatient Medications on File Prior to Visit   Medication Sig    HYDROcodone-acetaminophen (NORCO) 7.5-325 mg per tablet Take 1 Tab by mouth four (4) times daily as needed for Pain for up to 30 days. Max Daily Amount: 4 Tabs. No more than 100/month    Omega-3 Fatty Acids (FISH OIL) 500 mg cap Take  by mouth.  ASIF ASPIRIN PO Take  by mouth.  sildenafil citrate (VIAGRA) 50 mg tablet Take 50 mg by mouth as needed.  MV-MN/IRON/FA/VIT K/K. GINSENG (CENTRUM SPECIALIST ENERGY PO) Take  by mouth.  naloxone (NARCAN) 4 mg/actuation nasal spray 1 [place] by IntraNASal route as needed. Indications: OPIATE-INDUCED RESPIRATORY DEPRESSION    atorvastatin (LIPITOR) 20 mg tablet Take 10 mg by mouth daily. No current facility-administered medications on file prior to visit. ROS:  Denies fever, chills, nausea, vomiting, diarrhea, constipation, abdominal pain, chest pain, shortness or breath/trouble breathing, weakness, trouble swallowing, changes in vision, changes in hearing, falls, dizziness, bladder incontinence, bowel incontinence, depression, anxiety, suicidal ideations, homicidal ideations or alcohol use. Opioid specific risk: hx mariajuana use, multiple aberrancies including: (+) undisclosed Tramadol on UDS, (+) THC and Ethyl glucuronide and Ethyl sulfate on UDS. Vitals:    12/26/18 0822   BP: 133/78   Pulse: 83   Resp: 14   Temp: 97.7 °F (36.5 °C)   SpO2: 98%   Weight: 74.8 kg (165 lb)   Height: 5' 11\" (1.803 m)   PainSc:   6   PainLoc: Back        Imaging:  MRI Lumbar Spine 5/18/2015  \"\"Impression: 5.1mm mas within the left aspect of the dural tube at the L1 at the tip of the conus. 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 Small spinal canal on a congenital basis. Lumbar epidural lipomatosis.  Despite these findings, no evidence of significant central stenosis/lateral recess compromise or foraminal narrowing. No evidence of advanced facet arthropathy. \"\"      Labs:   BUN/Cr: 7/0.9 on 9/9/15  AST/ALT: 12/12 on 3/6/14      Physical exam:  AFVSS, no acute distress, normal body habitus. A&OXs 3. Normocephalic, atraumatic. Conjugate gaze, clear sclerae. Speech is clear and appropriate. Mood is appropriate and patient is cooperative. Gait and balance are within functional limits. Non-labored breathing. LE:   Full AROM lumbar flexion with reproduction of primary pain. AROM lumbar extension decreased by 25% with reproduction of primary pain. Strength for right lower extremity is 5/5 for hip flexion, knee extension, dorsiflexion, extensor hallucis longus, plantar flexion. Strength for left lower extremity is 5/5 for hip flexion, knee extension, dorsiflexion, extensor hallucis longus, plantar flexion. Sensation to light touch is intact for right L2-S2. Sensation to light touch is intact for left L2-S2. Negative ankle clonus on the right and the left. Negative seated straight leg raise bilaterally. Positive Stinchfield's on the left. Negative FABERs on the right and the left. \   TTP left SIJ region and midline lumbar region. Calculated MEQ - 30  Naloxone rescue - no unable to afford  Prophylactic bowel program - no  Date of last OCA today  Last UDS 11/23/18, not consistent (+) undisclosed Tramadol 790ng/ml w/metabolites   (-) - this is second occurrence; (+) THC 57ng/ml; Ethyl glucuronide 15,189ng/ml, Ethyl sulfate 2,606ng/ml. Discussed with Dr. Merrick Her - patient to be scheduled next available with Dr. Lois Hernandez for substance use disorder assessment, schedule for ETOH education class, accelerate opioid wean.  date checked today, findings consistent    Primary Care Physician  No primary care provider on file. No primary provider on file.   None    Today  ORT - 0  PGIC - 6 & 6  JOSÉ MIGUEL - 50%  COMM - 1    PHQ -- . PHQ over the last two weeks 12/26/2018   Little interest or pleasure in doing things Not at all   Feeling down, depressed, irritable, or hopeless Not at all   Total Score PHQ 2 0           Assessment/Plan:     ICD-10-CM ICD-9-CM    1. Encounter for long-term (current) use of high-risk medication Z79.899 V58.69    2. Chronic pain syndrome G89.4 338.4 cyclobenzaprine (FLEXERIL) 10 mg tablet   3. Chronic left-sided low back pain without sciatica M54.5 724.2 cyclobenzaprine (FLEXERIL) 10 mg tablet    G89.29 338.29    4. Facet arthritis of lumbar region ([address] M46.96 721. 3 cyclobenzaprine (FLEXERIL) 10 mg tablet   5. Lumbar foraminal stenosis M99.83 724.02 cyclobenzaprine (FLEXERIL) 10 mg tablet   6. Spondylosis of lumbar region without myelopathy or radiculopathy M47.816 721. 3 cyclobenzaprine (FLEXERIL) 10 mg tablet        Do not recommend long term opioid therapy for this patient at this time. Pt currently taking Norco 7.5/325mg up to 4 times a day as needed with a total of 110 tabs to be budgeted over 30 days. Their MME is 30. Today, we will continue the weaning of patients opioid medication with a goal of being opioid free, pending safety and compliance. Pt instructed to call if they experience any signs of withdrawal (diarrhea, nausea, vomiting, sweating or chills, agitation, itching). Today, pt given prescription for Norco 7.5/325mg up to 4 times a day as needed with a total of 100 tabs to be budgeted over 30 days (this was pre-printed). Pt is to follow up with Dr Trey Cazares in 30 days for substance use disorder assessment and further opiate weaning. Plan is to provide patient with Norco 7.5/325mg up to 3 times a day as needed with a a total of 90 tabs to be used over 30 days. His new MME will be 22.5. If patient has difficulty with the wean or difficulty with cravings we will consider referral to mental health for ongoing assessment and treatment for opioid use disorder.     Flexeril refilled today.  Plan to order TENS unit at next visit. Consider NexWave E-Stim device if unable to obtain TENS or if ineffective. Consider aquatic PT, acupuncture therapy, yoga therapy and/or massage therapy in the future. Unable to obtain Narcan, will send for Windy. UDS results discussed with patient. He will need to schedule ETOH Education Class. Follow up ongoing assessment and ongoing development of integrative and comprehensive plan of care for chronic pain. Goals: To establish complementary and integrative plan of care to address chronic pain issues while minimizing pharmaceuticals to maximize patient's function improve quality of life. Education:  Patient again educated on the importance of strict compliance with the opioid care agreement while on opioid therapy. Patient also again educated that they should avoid driving while on chronic opioid therapy. Also advised to avoid alcohol and to avoid benzodiazepines while on opioid therapy. Handouts given regarding opioid safety. Follow-up Disposition:  Return in about 1 month (around 1/26/2019) for 30 min. [address] was used for portions of this report. Unintended errors may occur.

## 2019-01-08 ENCOUNTER — TELEPHONE (OUTPATIENT)
Dept: PAIN MANAGEMENT | Age: 69
End: 2019-01-08

## 2019-01-08 NOTE — TELEPHONE ENCOUNTER
Attempted to contact patient regarding missed ETOH education class and scheduling appt with provider (NEREYDA) due to uds results; no answer noted at number given; vm left to contact triage line.

## 2019-01-16 ENCOUNTER — TELEPHONE (OUTPATIENT)
Dept: PAIN MANAGEMENT | Age: 69
End: 2019-01-16

## 2019-01-16 NOTE — TELEPHONE ENCOUNTER
Patient identified using two patient identifiers (name and )    Patient contacted regarding missed etoh class and appt with provider (NEREYDA) prior to next appt; patient is very hesitant to reschedule etoh class and to schedule appt with provider (NEREYDA); after much hesitation , patient agrees to attend etoh class on 19 at 10am and have appt with provider (NEREYDA) on 19 at 11am.

## 2019-01-31 ENCOUNTER — TELEPHONE (OUTPATIENT)
Dept: PAIN MANAGEMENT | Age: 69
End: 2019-01-31

## 2019-01-31 NOTE — TELEPHONE ENCOUNTER
Patient left voice on triage line wanting to confirm his appointment and education class for tomorrow. Patient identified using two patient identifiers (name and ) Confirmed patients appointments for tomorrow. Patient verbalizes an understanding of information . No further questions at this time.

## 2019-02-01 ENCOUNTER — OFFICE VISIT (OUTPATIENT)
Dept: PAIN MANAGEMENT | Age: 69
End: 2019-02-01

## 2019-02-01 VITALS
OXYGEN SATURATION: 99 % | DIASTOLIC BLOOD PRESSURE: 75 MMHG | BODY MASS INDEX: 23.1 KG/M2 | RESPIRATION RATE: 20 BRPM | HEIGHT: 71 IN | SYSTOLIC BLOOD PRESSURE: 118 MMHG | TEMPERATURE: 96.3 F | WEIGHT: 165 LBS | HEART RATE: 70 BPM

## 2019-02-01 DIAGNOSIS — M47.816 SPONDYLOSIS OF LUMBAR REGION WITHOUT MYELOPATHY OR RADICULOPATHY: ICD-10-CM

## 2019-02-01 DIAGNOSIS — M48.061 LUMBAR FORAMINAL STENOSIS: ICD-10-CM

## 2019-02-01 DIAGNOSIS — Z79.899 ENCOUNTER FOR LONG-TERM (CURRENT) USE OF HIGH-RISK MEDICATION: Primary | ICD-10-CM

## 2019-02-01 DIAGNOSIS — Z71.89 COUNSELING AND COORDINATION OF CARE: Primary | ICD-10-CM

## 2019-02-01 LAB
ALCOHOL UR POC: NORMAL
AMPHETAMINES UR POC: NEGATIVE
BARBITURATES UR POC: NEGATIVE
BENZODIAZEPINES UR POC: NEGATIVE
BUPRENORPHINE UR POC: NEGATIVE
CANNABINOIDS UR POC: NEGATIVE
CARISOPRODOL UR POC: NORMAL
COCAINE UR POC: NEGATIVE
FENTANYL UR POC: NORMAL
MDMA/ECSTASY UR POC: NEGATIVE
METHADONE UR POC: NEGATIVE
METHAMPHETAMINE UR POC: NEGATIVE
METHYLPHENIDATE UR POC: NORMAL
OPIATES UR POC: NEGATIVE
OXYCODONE UR POC: NEGATIVE
PHENCYCLIDINE UR POC: NORMAL
PROPOXYPHENE UR POC: NORMAL
TRAMADOL UR POC: NORMAL
TRICYCLICS UR POC: NEGATIVE

## 2019-02-01 RX ORDER — HYDROCODONE BITARTRATE AND ACETAMINOPHEN 7.5; 325 MG/1; MG/1
1 TABLET ORAL
Qty: 90 TAB | Refills: 0 | Status: SHIPPED | OUTPATIENT
Start: 2019-02-01 | End: 2019-04-02

## 2019-02-01 RX ORDER — HYDROCODONE BITARTRATE AND ACETAMINOPHEN 5; 325 MG/1; MG/1
1 TABLET ORAL
Qty: 120 TAB | Refills: 0 | Status: SHIPPED | OUTPATIENT
Start: 2019-03-01 | End: 2019-04-02

## 2019-02-01 NOTE — PROGRESS NOTES
Nursing Notes    Patient presents to the office today in follow-up. Patient rates his pain at 7/10 on the numerical pain scale. Reviewed medications with counts as follows:    Rx Date filled Qty Dispensed Pill Count Last Dose Short   norco 7.5/325mg  12/26/18 100 0 approx 01/20/19 instructions on bottle read to take up to four times daily, however, only #100 tabs dispensed                                          Last opioid agreement 01/2019  Last urine drug screen today    Comments:     POC UDS was performed in office today per verbal order of provider (NEREYDA); rbv'd. Any new labs or imaging since last appointment? NO    Have you been to an emergency room (ER) or urgent care clinic since your last visit? NO            Have you been hospitalized since your last visit? NO     If yes, where, when, and reason for visit? Have you seen or consulted any other health care providers outside of the [address] [place] [address]  since your last visit? NO     If yes, where, when, and reason for visit? Mr. Dede Reno has a reminder for a \"due or due soon\" health maintenance. I have asked that he contact his primary care provider for follow-up on this health maintenance.     PHQ over the last two weeks 2/1/2019   Little interest or pleasure in doing things Not at all   Feeling down, depressed, irritable, or hopeless Not at all   Total Score PHQ 2 0

## 2019-02-01 NOTE — PROGRESS NOTES
Mr. Dasia Eng attended the Education Class facilitated by Charis Mcintosh LPN. Objectives of this class are to review practice policies, protocols and the Controlled Substances Consent and Treatment Agreement regarding the concurrent use of opioids and ETOH. Ultimately, goals for this class are for Mr. Dasia Eng to:    · Be educated - Learn as much as possible about our policies and the Controlled Substances Agreement regarding the concurrent use of opioids and ETOH usage. ·   · Be responsible - Follow the providers advice regarding treatment recommendations, medications, and prescription information. At least 30 minutes was spent with the patient in face-to-face contact today.

## 2019-02-01 NOTE — PROGRESS NOTES
Internal Medicine  Progress Note  Today's Date:  2019   Patient:  Andres Neely  Patient :  1950    Subjective:   No chief complaint on file. HPI: Royal ZOIE Salazar is a 79 y.o.  male who presents for follow up. Pt being seen for evaluation for JIM, due to intermittent issues with abnormal UDS's. Pt states he had some issue with ETOH abuse in the , but none since. On average he consumes ETOH ever other month and during special occasions. Pt relates he is still suffering increased lower and left hip pain over approximately the last 1weeks, since he has been out of his medication. He relates pain in general has gotten worse over the last few years. His last MRI was in . Past Medical History:   Diagnosis Date    Essential hypertension      No past surgical history on file. REVIEW OF SYSTEMS:   Constitutional - no wt loss, night sweats, unexplained fevers  Oral - no mouth pain, tongue or tooth problems, no swallowing problems   Cardiac - no CP, PND, orthopnea, palpitations or syncope  Resp - no wheezing, chronic coughing, dyspnea  GI - no heartburn, nausea, vomiting,constipation, diarrhea,bleeding.  - no dysuria, hematuria, urgency, frequency  Ortho - no muscle swelling,joint swelling, joint pain,  myalgias  Derm - no  rashes, lesions. Psych - denies any anxiety or depression symptoms, no hallucinations, suicidal, or violent ideation  Neuro - no focal weakness,incoordination, ataxia, involuntary movements  Endo - no polyuria, polydipsia, nocturia, hot flashes        Calculated MEQ - 30  Naloxone rescue - nino  Prophylactic bowel program - no  PHQ-9- 0  COMM SCORE- 2  PHQ over the last two weeks 2019   Little interest or pleasure in doing things Not at all   Feeling down, depressed, irritable, or hopeless Not at all   Total Score PHQ 2 0         No primary care provider on file. No primary provider on file.     Current Outpatient Meds and Allergies Current Outpatient Medications on File Prior to Visit   Medication Sig Dispense Refill    Omega-3 Fatty Acids (FISH OIL) 500 mg cap Take  by mouth.  ASIF ASPIRIN PO Take  by mouth.  sildenafil citrate (VIAGRA) 50 mg tablet Take 50 mg by mouth as needed.  MV-MN/IRON/FA/VIT K/K. GINSENG (Pharmly PO) Take  by mouth. No current facility-administered medications on file prior to visit. No Known Allergies       Objective:       BP Readings from Last 3 Encounters:   02/01/19 118/75   12/26/18 133/78   09/25/18 129/90     VS:    Visit Vitals  /75 (BP 1 Location: Right arm, BP Patient Position: Sitting)   Pulse 70   Temp 96.3 °F (35.7 °C) (Oral)   Resp 20   Ht 5' 11\" (1.803 m)   Wt 74.8 kg (165 lb)   SpO2 99%   BMI 23.01 kg/m²       Body mass index is 23.01 kg/m². GENERAL: W/D, W/N, Male in no acute distress. APPEARANCE: Well groomed, Appropriately Dressed. ATTITUDE: Pleasant, Cooperative/Uncooperative, Guarded,Hostile, Combative,    Threatening. SPEECH: Normal/Slowed/Rapid Rate, Clear/Slurred, Pressured, Garbled, Talkative. AFFECT: Appropriate, Inappropriate, Bright, Sad, Worried, Flattened, Labile, Angry, Tearful. MOOD: Euthymic, Dysthymic, Elated, Irritable, Other. THOUGHT PROCESS: Linear, Logical, Disorganized, Tangential, Normal/Poor Judgement and Insight, Other. THOUGHT CONTENT: Normal, Delusional, Obsessive, Hallucination  MEMORY: Grossly Intact, Poor. HEENT -- NCAT, Anicteric sclerae. Mus/Skel--  bulk and tone appear normal/reduced,. Derm-- no obvious abnormalities noted.         Results for orders placed or performed in visit on 02/01/19   AMB POC DRUG SCREEN ()   Result Value Ref Range    ALCOHOL UR POC      AMPHETAMINES UR POC Negative     CARISOPRODOL UR POC      COCAINE UR POC Negative     FENTANYL UR POC      MDMA/ECSTASY UR POC Negative     METHADONE UR POC Negative     METHAMPHETAMINE UR POC Negative     METHYLPHENIDATE UR POC OPIATES UR POC Negative     OXYCODONE UR POC Negative     PHENCYCLIDINE UR POC      PROPOXYPHENE UR POC      TRAMADOL UR POC      TRICYCLICS UR POC Negative     BARBITURATES UR POC Negative     BENZODIAZEPINES UR POC Negative     CANNABINOIDS UR POC Negative     BUPRENORPHINE UR POC Negative      Alcohol Use Disorder Assessment:    1. Alcohol is often taken in larger amounts over a longer period than was intended. No    2. There is a persistent desire or unsuccessful effort to cut down or control alcohol use. No    3. A great deal of time is spent in activities necessary to obtain alcohol, use alcohol, or recover from its effect. No    4. Craving, or a strong desire or urge to use alcohol. No    5. Recurrent alcohol use resulting in a failure to fulfill major role obligations at work, school, or home. No    6. Continued alcohol use despite having persistent or recurrent social or interpersonal problems caused or exacerbated by the effects of alcohol. No    7. Important social, occupational, or recreational activities are given up or reduced because of alcohol use. No    8. Recurrent alcohol use in situations in which it is physically hazardous. Yes, remote h/o 2 DUI's in the '80's, no issues since. 9. Alcohol use is continued despite knowledge of having a persistent or recurrent physical or psychological problem that is likely to have been caused or exacerbated by alcohol. No    10. Tolerance, as defined by either of the following : a) A need for markedly increased amounts of alcohol to achieve intoxication or desired effect, b) A markedly diminished effect with continued use of the same amount of alcohol. No    11. Withdrawal, as manifested by either of the following: a) The characteristic withdrawal syndrome for alcohol, b) Alcohol (or closely related substances, such as benzodiazepine) is taken to relieve or avoid withdrawal symptoms.  No    As a result of the screening, Pt falls into the following category:    Mild: 2 to 3 symptoms  Discussed responsible drinking, Pt attended required last.       SUBSTANCE DEPENDENCE DISORDER ASSESSMENT: pertaining to Pt's marijuana use. 1. Tolerance, as defined by either of the following:     A. A need for increasing amounts of the substance to achieve desired effect or intoxication. No     B. Diminishing effect with continued use of the same amount of the substance. No    2. Withdrawal, as manifested by either of the following:     A. The characteristic withdrawal syndrome. No     B. The same (or closely related) substance is taken to prevent or relieve withdrawal symptoms. No    3. The substance is often taken in larger amounts or over a longer period of time than recommended or needed. N/A    4. There is a persistent desire or unsuccessful attempts to reduce or control substance use. No    5. Significant time is spent in activities necessary to obtain the substance, use the substance, or recover from its effects. No    6. Important social, occupational, or recreational activities are sacrificed or reduced due to substance use. No    7. The substance use is continued despite awareness of a persistent or recurrent physical or psychological issue likely to have been caused or exacerbated by the substance. No    Pt dose not meet the criteria for a JIM. Discussed potential legal ramification of continued marijuana use. Recommendation made for abstinence form further marijuana use. Discussed need for  plan to discontinue marijuana use. Discussed risk of d/c from treatment should Pt test positive for any illicit drug as a result of his marijuana use. Assessment/Plan & Orders:     I have reviewed this patient's report generated by the [facility] which does not demonstrate aberrancies and inconsistencies with regard to the historical prescribing of controlled medications to this patient by other providers.     Problem List Items Addressed This Visit     Lumbar foraminal stenosis    Spondylarthrosis      Other Visit Diagnoses     Encounter for long-term (current) use of high-risk medication    -  Primary    Relevant Orders    DRUG SCREEN    AMB POC DRUG SCREEN () (Completed)          Diagnoses and all orders for this visit:    1. Encounter for long-term (current) use of high-risk medication  -     DRUG SCREEN  -     AMB POC DRUG SCREEN ()    2. Spondylosis of lumbar region without myelopathy or radiculopathy    3. Lumbar foraminal stenosis        MME = 22.5    Post reduction in medication, then to 20, further wean at that point per [facility]. Follow-up Disposition:  Return in about 2 months (around 4/1/2019) for Appointment with [facility]. Reviewed with patient the treatment plan, goals of treatment plan, and limitations of treatment plan, to include the potential for side effects from medications and procedures. If side effects occur, it is the responsibility of the patient to inform the clinic so that a change in the treatment plan can be made in a safe manner. The patient is advised that stopping prescribed medication may cause an increase in symptoms and possible medication withdrawal symptoms. The patient is informed an emergency room evaluation may be necessary if this occurs. Had lengthy discussion with Pt regarding the direction of this facility away from opioids being the primary treatment option, and the need for exhaustion all other potential options to address his pain    GOALS:  To establish complementary and integrative plan of care to address chronic pain issues while minimizing pharmaceuticals to maximize patient's function improve quality of life.     Education:  Patient again educated on the importance of strict compliance with the opioid care agreement while on opioid therapy. Patient also again educated that they should avoid driving while on chronic opioid therapy.   Also advised to avoid alcohol and to avoid benzodiazepines while on opioid therapy. Patient verbalized understanding and is in agreement with treatment plan as outlined above. All questions answered. I spent 40 minutes with the patient in face-to-face consultation, of which greater than 50% was spent in counseling and coordination of care as described above.          Jimena Alvarez MD

## 2019-02-14 DIAGNOSIS — M48.061 SPINAL STENOSIS OF LUMBAR REGION AT MULTIPLE LEVELS: Primary | ICD-10-CM

## 2019-02-15 ENCOUNTER — TELEPHONE (OUTPATIENT)
Dept: PAIN MANAGEMENT | Age: 69
End: 2019-02-15

## 2019-02-15 NOTE — TELEPHONE ENCOUNTER
----- Message from Pau Orozco MD sent at 2/14/2019  1:18 PM EST -----  Regarding: Lumbar MRI result  I reviewed Pt's MRI. Result discussed with Cayla Dillon, who Pt is scheduled to follow up with. Pt needs notification that MRI shows significant DJD with spinal stenosis. I will initiate a referral to Neurosurgery.

## 2019-03-01 NOTE — TELEPHONE ENCOUNTER
Royal ZOIE Jacobson has called requesting a refill of their controlled medication, Norco 5/325 mg tab, for the management of chronic pain. Last office visit date: 2/1/19 with  and has a f/u appt on 4/1/19 with Alexis. Last opioid care agreement 12/26/18  Last UDS was done 2/1/19    Date last  was pulled and reviewed : 3/1/19  Last fill date for medication was 2/1/19    Was the patient compliant when the above report was pulled? yes    Analgesia: Per Kin Patient reports 80% pain relief on current regimen. Aberrancies: No aberrancies noted in the last 30 days. ADL's: Per Kin, Patient states they are able to perform ADL's on current regimen. Adverse Reaction: Per Kin, Patient reports no adverse reactions at this time. Provider's last note and plan of care reviewed? yes  Request forwarded to provider for review. Rx pre-printed by provider.

## 2019-03-01 NOTE — TELEPHONE ENCOUNTER
Patient called the office today concerning his medication refill request I completed the 4 A's    vicodin    80% pain relief    Yes  --- able to perform daily task    No -- side affects     Please call patient when meds are ready.

## 2019-03-01 NOTE — TELEPHONE ENCOUNTER
Patient was last seen by Ryan Ca and has a Rx for Norco 5/325 mg tab pre-printed with a start date of 3/1/19.

## 2019-04-02 ENCOUNTER — OFFICE VISIT (OUTPATIENT)
Dept: PAIN MANAGEMENT | Age: 69
End: 2019-04-02

## 2019-04-02 VITALS
SYSTOLIC BLOOD PRESSURE: 104 MMHG | TEMPERATURE: 98.4 F | HEIGHT: 71 IN | RESPIRATION RATE: 14 BRPM | DIASTOLIC BLOOD PRESSURE: 62 MMHG | BODY MASS INDEX: 23.1 KG/M2 | HEART RATE: 67 BPM | OXYGEN SATURATION: 98 % | WEIGHT: 165 LBS

## 2019-04-02 DIAGNOSIS — G89.29 CHRONIC LEFT-SIDED LOW BACK PAIN WITHOUT SCIATICA: ICD-10-CM

## 2019-04-02 DIAGNOSIS — Z79.899 ENCOUNTER FOR LONG-TERM (CURRENT) USE OF HIGH-RISK MEDICATION: ICD-10-CM

## 2019-04-02 DIAGNOSIS — M54.50 CHRONIC LEFT-SIDED LOW BACK PAIN WITHOUT SCIATICA: ICD-10-CM

## 2019-04-02 DIAGNOSIS — G89.4 CHRONIC PAIN SYNDROME: Primary | ICD-10-CM

## 2019-04-02 DIAGNOSIS — M47.816 SPONDYLOSIS OF LUMBAR REGION WITHOUT MYELOPATHY OR RADICULOPATHY: ICD-10-CM

## 2019-04-02 DIAGNOSIS — M48.061 LUMBAR FORAMINAL STENOSIS: ICD-10-CM

## 2019-04-02 RX ORDER — ACETAMINOPHEN 500 MG
TABLET ORAL
Qty: 90 TAB | Refills: 2 | Status: SHIPPED | OUTPATIENT
Start: 2019-04-02 | End: 2019-06-25 | Stop reason: SDUPTHER

## 2019-04-02 RX ORDER — HYDROCODONE BITARTRATE AND ACETAMINOPHEN 5; 325 MG/1; MG/1
1 TABLET ORAL
Qty: 115 TAB | Refills: 0 | Status: SHIPPED | OUTPATIENT
Start: 2019-04-05 | End: 2019-05-01 | Stop reason: SDUPTHER

## 2019-04-02 NOTE — PROGRESS NOTES
Referral date 11/10/15, source Dr Sang Smyth and for LBP. HPI:  Royal ZOIE Scruggs is a 81 y.o. male here for f/u visit for ongoing evaluation of chronic lower back pain. No history of lumbar surgery. Pt was last seen here on 2/1/19 with Dr Lisbeth Morales. Pt denies interval changes on the character or distribution of pain but reports increased intensity due to decreased in opioids. Pain is located lumbar region and described as stabbing. Pain at its best is 5/10. Pain at its worse is 10/10. The pain is worsened by lifting, prolonged sitting, prolonged standing and prolonged walking. Symptoms are improved by heating pad, Bengay, massage temporarily, Flexeril, yoga temporarily, Norco and lumbar epidural steroid injection. Pt has tried NSAIDs, OTC topical medications, trigger point injections, Botox injections, PT, aquatic PT, Gabapentin, Topamax, Lyrica and Elavil with no perceived benefit. He has never tried TENS unit, SCS trial, implantable pain pump, acupuncture, chiropractor or Cymbalta. Since last visit, he has not obtained TENS due to it not being covered by his insurance. He has received Evzio. He completed alcohol education class. Social History     Socioeconomic History    Marital status: UNKNOWN     Spouse name: Not on file    Number of children: Not on file    Years of education: Not on file    Highest education level: Not on file   Occupational History    Not on file   Social Needs    Financial resource strain: Not on file    Food insecurity:     Worry: Not on file     Inability: Not on file    Transportation needs:     Medical: Not on file     Non-medical: Not on file   Tobacco Use    Smoking status: Former Smoker    Smokeless tobacco: Never Used   Substance and Sexual Activity    Alcohol use:  Yes    Drug use: No     Comment: hx of cocaine \"1972\" and thc \"2014\"    Sexual activity: Not on file   Lifestyle    Physical activity:     Days per week: Not on file     Minutes per session: Not on file  Stress: Not on file   Relationships    Social connections:     Talks on phone: Not on file     Gets together: Not on file     Attends Christianity service: Not on file     Active member of club or organization: Not on file     Attends meetings of clubs or organizations: Not on file     Relationship status: Not on file    Intimate partner violence:     Fear of current or ex partner: Not on file     Emotionally abused: Not on file     Physically abused: Not on file     Forced sexual activity: Not on file   Other Topics Concern    Not on file   Social History Narrative    Not on file     Family History   Problem Relation Age of Onset    Hypertension Mother     Seizures Mother     Heart Attack Father      No Known Allergies  Past Medical History:   Diagnosis Date    Essential hypertension      No past surgical history on file. Current Outpatient Medications on File Prior to Visit   Medication Sig    Omega-3 Fatty Acids (FISH OIL) 500 mg cap Take  by mouth.  ASIF ASPIRIN PO Take  by mouth.  sildenafil citrate (VIAGRA) 50 mg tablet Take 50 mg by mouth as needed.  MV-MN/IRON/FA/VIT K/K. GINSENG ([employer] PO) Take  by mouth. No current facility-administered medications on file prior to visit. ROS:  Denies fever, chills, nausea, vomiting, diarrhea, constipation, abdominal pain, chest pain, shortness or breath/trouble breathing, weakness, trouble swallowing, changes in vision, changes in hearing, falls, dizziness, bladder incontinence, bowel incontinence, depression, anxiety, suicidal ideations, homicidal ideations or alcohol use. Opioid specific risk: [facility], multiple aberrancies including: (+) undisclosed Tramadol on UDS, (+) THC and Ethyl glucuronide and Ethyl sulfate on UDS.       Vitals:    04/02/19 0815   BP: 104/62   Pulse: 67   Resp: 14   Temp: 98.4 °F (36.9 °C)   SpO2: 98%   Weight: 74.8 kg (165 lb)   Height: 5' 11\" (1.803 m)   PainSc:   7   PainLoc: Back Physical exam:  AFVSS, no acute distress, normal body habitus. A&OXs 3. Normocephalic, atraumatic. Conjugate gaze, clear sclerae. Speech is clear and appropriate. Mood is appropriate and patient is cooperative. Gait and balance are within functional limits. Non-labored breathing. Calculated MEQ - 20  Naloxone rescue - yes  Prophylactic bowel program - no  Date of last OCA 12/26/18  Last UDS 2/1/19, pending confirmatory testing   Prior UDS 11/23/18, not consistent (+) undisclosed Tramadol 790ng/ml w/metabolites   (-) - this is second occurrence; (+) THC 57ng/ml; Ethyl glucuronide 15,189ng/ml, Ethyl sulfate 2,606ng/ml. Discussed with Dr. Obdulio English - patient to be scheduled next available with Dr. Lisa Spence for substance use disorder assessment, schedule for ETOH education class, accelerate opioid wean.  date checked today, findings consistent    Primary Care Physician  No primary care provider on file. No primary provider on file. Today   Prior Visit  ORT - n/a  0  PGIC - 6 & 7  6 & 6  JOSÉ MIGUEL - 52%  50%  COMM - 3  1    PHQ -- .  3 most recent PHQ Screens 4/2/2019   Little interest or pleasure in doing things Not at all   Feeling down, depressed, irritable, or hopeless Not at all   Total Score PHQ 2 0           Assessment/Plan:     ICD-10-CM ICD-9-CM    1. Chronic pain syndrome G89.4 338.4    2. Spondylosis of lumbar region without myelopathy or radiculopathy M47.816 721.3 HYDROcodone-acetaminophen (NORCO) 5-325 mg per tablet      acetaminophen (TYLENOL EXTRA STRENGTH) 500 mg tablet   3. Lumbar foraminal stenosis M99.83 724.02 HYDROcodone-acetaminophen (NORCO) 5-325 mg per tablet      acetaminophen (TYLENOL EXTRA STRENGTH) 500 mg tablet   4. Chronic left-sided low back pain without sciatica M54.5 724.2     G89.29 338.29    5.  Encounter for long-term (current) use of high-risk medication Z79.899 V58.69 HYDROcodone-acetaminophen (NORCO) 5-325 mg per tablet        Do not recommend long term opioid therapy for this patient at this time; risks outweigh potential benefits. Pt currently taking Norco 5/325mg up to 4 times a day as needed with a total of 120 tabs to be budgeted over 30 days. Their MME is 20. Today, we will continue the weaning of patients opioid medication with a goal of being opioid free, pending safety and compliance. Pt instructed to call if they experience any signs of withdrawal (diarrhea, nausea, vomiting, sweating or chills, agitation, itching). Today, pt given prescription for Norco 5/325mg up to 4 times a day as needed with a total of 115 tabs to be budgeted over 30 days. Pt instructed to call 5-7 days before they run out of their medications for refill. At this time pt will be provided with Norco 5/325mg up to 4 times a day as needed with a total of 110 tabs to be budgeted over 30 days pending safety and compliance. At following refill, pt will be provided with Norco 5/325mg up to 4 times a day as needed with a total of 105 tabs to be budgeted over 30 days pending safety and compliance. At next office visit, the plan is to provide patient with Norco 5/325mg up to 4 times a day as needed with a total of 100 tabs to be budgeted over 30 days. If patient has difficulty with the wean or difficulty with cravings we will consider referral to mental health for ongoing assessment and treatment for opioid use disorder. Discussed tylenol optimization today; pt instructed he can take an additional three 500mg tabs of tylenol per day with a max of 3000mg acetaminophen from all sources in a 24 hours period. Continue Flexeril as needed. Consider consult with Dr David Bonds regarding epidural steroid injection as patient has had temporary relief with this in the past.    Follow up ongoing assessment and ongoing development of integrative and comprehensive plan of care for chronic pain. Goals:   To establish complementary and integrative plan of care to address chronic pain issues while minimizing pharmaceuticals to maximize patient's function improve quality of life. Education:  Patient again educated on the importance of strict compliance with the opioid care agreement while on opioid therapy. Patient also again educated that they should avoid driving while on chronic opioid therapy. Also advised to avoid alcohol and to avoid benzodiazepines while on opioid therapy. Handouts given regarding opioid safety. Follow-up and Dispositions    · Return in about 3 months (around 7/2/2019) for 30 min. [address] was used for portions of this report. Unintended errors may occur.

## 2019-04-02 NOTE — PATIENT INSTRUCTIONS

## 2019-04-02 NOTE — PROGRESS NOTES
Nursing Notes    Patient presents to the office today in follow-up. Patient rates his pain at 7/10 on the numerical pain scale. Reviewed medications with counts as follows:    Rx Date filled Qty Dispensed Pill Count Last Dose Short   Norco 5 mg 03/06/19 120 0 Sunday pm 4 days         reviewed YES  Any aberrancies noted on  YES  Last opioid agreement 12/26/18  Last urine drug screen 02/01/19    Comments:  Patient is here today for a follow up appt today for his chronic low back pain. He states his pain level today is a 7  PHQ 2 was done patient denies any depression. POC UDS was not performed in office today    Any new labs or imaging since last appointment? NO    Have you been to an emergency room (ER) or urgent care clinic since your last visit? NO            Have you been hospitalized since your last visit? NO     If yes, where, when, and reason for visit? Have you seen or consulted any other health care providers outside of the [address] [place] [address]  since your last visit? NO     If yes, where, when, and reason for visit? Mr. Jj Saeed has a reminder for a \"due or due soon\" health maintenance. I have asked that he contact his primary care provider for follow-up on this health maintenance.

## 2019-05-01 DIAGNOSIS — M48.061 LUMBAR FORAMINAL STENOSIS: ICD-10-CM

## 2019-05-01 DIAGNOSIS — M47.816 SPONDYLOSIS OF LUMBAR REGION WITHOUT MYELOPATHY OR RADICULOPATHY: ICD-10-CM

## 2019-05-01 DIAGNOSIS — Z79.899 ENCOUNTER FOR LONG-TERM (CURRENT) USE OF HIGH-RISK MEDICATION: ICD-10-CM

## 2019-05-01 RX ORDER — HYDROCODONE BITARTRATE AND ACETAMINOPHEN 5; 325 MG/1; MG/1
1 TABLET ORAL
Qty: 110 TAB | Refills: 0 | Status: SHIPPED | OUTPATIENT
Start: 2019-05-04 | End: 2019-06-03 | Stop reason: SDUPTHER

## 2019-05-01 NOTE — TELEPHONE ENCOUNTER
Royal ZOIE Mata has called requesting a refill of their controlled medication, percocet, for the management of his chronic back pain. Last office visit date: 04/02/19  Last opioid care agreement 12/26/18  Last UDS was done 02/01/19    Date last  was pulled and reviewed : 05/01/19  Last fill date for medication was 04/05/19 for percocet 5/325 mg #115 tabs    Was the patient compliant when the above report was pulled? yes    Analgesia: pt reports an 80% pain relief with his current opioid medication regimen    Aberrancies: none noted     ADL's: pt reports that he is able to perform his daily tasks because he is taking his medication. Adverse Reaction: none reported by the pt. Provider's last note and plan of care reviewed? yes  Request forwarded to provider for review.

## 2019-05-01 NOTE — TELEPHONE ENCOUNTER
Pt was contacted about his refill request. The pt was identified using 2 pt identifiers. He was informed that his prescription was done and is ready for . He verbalized understanding and has no questions at this time.

## 2019-06-03 DIAGNOSIS — Z79.899 ENCOUNTER FOR LONG-TERM (CURRENT) USE OF HIGH-RISK MEDICATION: ICD-10-CM

## 2019-06-03 DIAGNOSIS — M47.816 SPONDYLOSIS OF LUMBAR REGION WITHOUT MYELOPATHY OR RADICULOPATHY: ICD-10-CM

## 2019-06-03 DIAGNOSIS — M48.061 LUMBAR FORAMINAL STENOSIS: ICD-10-CM

## 2019-06-03 NOTE — TELEPHONE ENCOUNTER
Patient called about medication refill(Hydrocodone)    80%  Yes  No    Please give patient a call [phone]

## 2019-06-04 ENCOUNTER — TELEPHONE (OUTPATIENT)
Dept: PAIN MANAGEMENT | Age: 69
End: 2019-06-04

## 2019-06-04 RX ORDER — HYDROCODONE BITARTRATE AND ACETAMINOPHEN 5; 325 MG/1; MG/1
1 TABLET ORAL
Qty: 105 TAB | Refills: 0 | Status: SHIPPED | OUTPATIENT
Start: 2019-06-04 | End: 2019-06-25 | Stop reason: SDUPTHER

## 2019-06-04 NOTE — TELEPHONE ENCOUNTER
Patient identified using two patient identifiers (name and ); patient advised prescriptions are ready for pick-up. Patient was called and informed that her Rx was ready for . The patient verbalized understanding of the phone call.

## 2019-06-04 NOTE — TELEPHONE ENCOUNTER
Royal ZOIE Hernandez has called requesting a refill of their controlled medication Norcop for the management of chronic pain syndrome . Last office visit date: 04/02/19  Last opioid care agreement 12/26/18  Last UDS was done 02/01/19    Date last  was pulled and reviewed : 06/04/19  Last fill date for medication was 05/04/19    Was the patient compliant when the above report was pulled? yes    Analgesia: The patient states that he receives 80% of pain relief from the medication     Aberrancies: There are no recent aberrancies noted at this time     ADL's: The patient states that he is able to perform his adls while on the medication     Adverse Reaction: There are no adverse reactions note at this time     Provider's last note and plan of care reviewed? yes  Request forwarded to provider for review.

## 2019-06-25 ENCOUNTER — OFFICE VISIT (OUTPATIENT)
Dept: PAIN MANAGEMENT | Age: 69
End: 2019-06-25

## 2019-06-25 VITALS
RESPIRATION RATE: 14 BRPM | HEART RATE: 85 BPM | SYSTOLIC BLOOD PRESSURE: 121 MMHG | OXYGEN SATURATION: 98 % | TEMPERATURE: 96.5 F | WEIGHT: 165 LBS | HEIGHT: 71 IN | DIASTOLIC BLOOD PRESSURE: 78 MMHG | BODY MASS INDEX: 23.1 KG/M2

## 2019-06-25 DIAGNOSIS — M48.061 LUMBAR FORAMINAL STENOSIS: Primary | ICD-10-CM

## 2019-06-25 DIAGNOSIS — M47.816 SPONDYLOSIS OF LUMBAR REGION WITHOUT MYELOPATHY OR RADICULOPATHY: ICD-10-CM

## 2019-06-25 DIAGNOSIS — M54.50 CHRONIC LEFT-SIDED LOW BACK PAIN WITHOUT SCIATICA: ICD-10-CM

## 2019-06-25 DIAGNOSIS — M47.816 FACET ARTHRITIS OF LUMBAR REGION: ICD-10-CM

## 2019-06-25 DIAGNOSIS — Z79.899 ENCOUNTER FOR LONG-TERM (CURRENT) USE OF HIGH-RISK MEDICATION: ICD-10-CM

## 2019-06-25 DIAGNOSIS — G89.29 CHRONIC LEFT-SIDED LOW BACK PAIN WITHOUT SCIATICA: ICD-10-CM

## 2019-06-25 DIAGNOSIS — G89.4 CHRONIC PAIN SYNDROME: ICD-10-CM

## 2019-06-25 RX ORDER — ACETAMINOPHEN 500 MG
TABLET ORAL
Qty: 90 TAB | Refills: 2 | Status: SHIPPED | OUTPATIENT
Start: 2019-06-25

## 2019-06-25 RX ORDER — HYDROCODONE BITARTRATE AND ACETAMINOPHEN 5; 325 MG/1; MG/1
1 TABLET ORAL
Qty: 100 TAB | Refills: 0 | Status: SHIPPED | OUTPATIENT
Start: 2019-07-04 | End: 2019-06-25 | Stop reason: SDUPTHER

## 2019-06-25 RX ORDER — HYDROCODONE BITARTRATE AND ACETAMINOPHEN 5; 325 MG/1; MG/1
1 TABLET ORAL
Qty: 100 TAB | Refills: 0 | Status: SHIPPED | OUTPATIENT
Start: 2019-07-04 | End: 2019-08-01 | Stop reason: SDUPTHER

## 2019-06-25 RX ORDER — CYCLOBENZAPRINE HCL 10 MG
10 TABLET ORAL
Qty: 30 TAB | Refills: 2 | Status: SHIPPED | OUTPATIENT
Start: 2019-06-25 | End: 2019-07-25

## 2019-06-25 NOTE — PATIENT INSTRUCTIONS

## 2019-06-25 NOTE — PROGRESS NOTES
Nursing Notes    Patient presents to the office today in follow-up. Patient rates his pain at 5/10 on the numerical pain scale. Reviewed medications with counts as follows:    Rx Date filled Qty Dispensed Pill Count Last Dose Short   Hydrocodone 5-325 mg 6/4/19 105 21 yesterday no                                       reviewed YES  Any aberrancies noted on  NO  Last opioid agreement 12/26/18  Last urine drug screen 2/1/19  3 most recent PHQ Screens 6/25/2019   Little interest or pleasure in doing things Not at all   Feeling down, depressed, irritable, or hopeless Not at all   Total Score PHQ 2 0       Comments:     POC UDS was not performed in office today    Any new labs or imaging since last appointment? NO    Have you been to an emergency room (ER) or urgent care clinic since your last visit? NO            Have you been hospitalized since your last visit? NO     If yes, where, when, and reason for visit? Have you seen or consulted any other health care providers outside of the [address] [place] [address]  since your last visit? YES     If yes, where, when, and reason for visit? Mr. Delvin Collet has a reminder for a \"due or due soon\" health maintenance. I have asked that he contact his primary care provider for follow-up on this health maintenance.

## 2019-06-28 NOTE — PROGRESS NOTES
Referral date 11/10/15, source Dr Breanne Orozco and for LBP. HPI:  Royal ZOIE Sanford is a 81 y.o. male here for f/u visit for ongoing evaluation of chronic lower back pain. No history of lumbar surgery. Pt was last seen here on 4/2/19. Pt denies interval changes on the character or distribution of pain. Pain is located lumbar region and described as stabbing. Pain at its best is 5/10. Pain at its worse is 10/10. The pain is worsened by lifting, prolonged sitting, prolonged standing and prolonged walking. Symptoms are improved by heating pad, Bengay, massage temporarily, Flexeril, yoga temporarily, Norco and lumbar epidural steroid injection. Pt has tried NSAIDs, OTC topical medications, trigger point injections, Botox injections, PT, aquatic PT, Gabapentin, Topamax, Lyrica and Elavil with no perceived benefit. He has never tried TENS unit, SCS trial, implantable pain pump, acupuncture, chiropractor or Cymbalta. Since last visit, he reports \"feeling better mentally\" and \"more clear\". He has not obtained TENS. He has been going to the [facility] for exercising and stretching. Social History     Socioeconomic History    Marital status: UNKNOWN     Spouse name: Not on file    Number of children: Not on file    Years of education: Not on file    Highest education level: Not on file   Occupational History    Not on file   Social Needs    Financial resource strain: Not on file    Food insecurity:     Worry: Not on file     Inability: Not on file    Transportation needs:     Medical: Not on file     Non-medical: Not on file   Tobacco Use    Smoking status: Former Smoker    Smokeless tobacco: Never Used   Substance and Sexual Activity    Alcohol use:  Yes    Drug use: No     Comment: hx of cocaine \"1972\" and thc \"2014\"    Sexual activity: Not on file   Lifestyle    Physical activity:     Days per week: Not on file     Minutes per session: Not on file    Stress: Not on file   Relationships    Social connections: Talks on phone: Not on file     Gets together: Not on file     Attends Denominational service: Not on file     Active member of club or organization: Not on file     Attends meetings of clubs or organizations: Not on file     Relationship status: Not on file    Intimate partner violence:     Fear of current or ex partner: Not on file     Emotionally abused: Not on file     Physically abused: Not on file     Forced sexual activity: Not on file   Other Topics Concern    Not on file   Social History Narrative    Not on file     Family History   Problem Relation Age of Onset    Hypertension Mother     Seizures Mother     Heart Attack Father      No Known Allergies  Past Medical History:   Diagnosis Date    Essential hypertension      History reviewed. No pertinent surgical history. Current Outpatient Medications on File Prior to Visit   Medication Sig    Omega-3 Fatty Acids (FISH OIL) 500 mg cap Take  by mouth.  ASIF ASPIRIN PO Take  by mouth.  sildenafil citrate (VIAGRA) 50 mg tablet Take 50 mg by mouth as needed.  MV-MN/IRON/FA/VIT K/K. GINSENG (Movetis PO) Take  by mouth. No current facility-administered medications on file prior to visit. ROS:  Denies fever, chills, nausea, vomiting, diarrhea, constipation, abdominal pain, chest pain, shortness or breath/trouble breathing, weakness, trouble swallowing, changes in vision, changes in hearing, falls, dizziness, bladder incontinence, bowel incontinence, depression, anxiety, suicidal ideations, homicidal ideations or alcohol use. Opioid specific risk: hx mariajuana and cocaine use, multiple aberrancies including: (+) undisclosed Tramadol on UDS, (+) THC and Ethyl glucuronide and Ethyl sulfate on UDS.       Vitals:    06/25/19 1509   BP: 121/78   Pulse: 85   Resp: 14   Temp: 96.5 °F (35.8 °C)   TempSrc: Oral   SpO2: 98%   Weight: 74.8 kg (165 lb)   Height: 5' 11\" (1.803 m)   PainSc:   5   PainLoc: Back         Physical exam:  AFVSS, no acute distress, normal body habitus. A&OXs 3. Normocephalic, atraumatic. Conjugate gaze, clear sclerae. Speech is clear and appropriate. Mood is appropriate and patient is cooperative. Gait and balance are within functional limits. Non-labored breathing. Calculated MEQ - 20  Naloxone rescue - yes  Prophylactic bowel program - no  Date of last OCA 12/26/18  Last UDS 2/1/19, consistent  Prior UDS 11/23/18, not consistent (+) undisclosed Tramadol 790ng/ml w/metabolites   (-) - this is second occurrence; (+) THC 57ng/ml; Ethyl glucuronide 15,189ng/ml, Ethyl sulfate 2,606ng/ml.  date checked today, findings consistent    Primary Care Physician  No primary care provider on file. No primary provider on file. Today   Last Visit Prior Visit(s)  ORT - n/a  n/a  0  PGIC - incomplete 6 & 7  6 & 6  JOS[facility] MIGUEL - 40%  52%  50%  COMM - 3  3  1    PHQ -- .  3 most recent PHQ Screens 6/25/2019   Little interest or pleasure in doing things Not at all   Feeling down, depressed, irritable, or hopeless Not at all   Total Score PHQ 2 0         Assessment/Plan:     ICD-10-CM ICD-9-CM    1. Lumbar foraminal stenosis M99.83 724.02 cyclobenzaprine (FLEXERIL) 10 mg tablet      acetaminophen (TYLENOL EXTRA STRENGTH) 500 mg tablet      HYDROcodone-acetaminophen (NORCO) 5-325 mg per tablet      DISCONTINUED: HYDROcodone-acetaminophen (NORCO) 5-325 mg per tablet   2. Chronic pain syndrome G89.4 338.4 cyclobenzaprine (FLEXERIL) 10 mg tablet   3. Chronic left-sided low back pain without sciatica M54.5 724.2 cyclobenzaprine (FLEXERIL) 10 mg tablet    G89.29 338.29    4. Spondylosis of lumbar region without myelopathy or radiculopathy M47.816 721. 3 cyclobenzaprine (FLEXERIL) 10 mg tablet      acetaminophen (TYLENOL EXTRA STRENGTH) 500 mg tablet      HYDROcodone-acetaminophen (NORCO) 5-325 mg per tablet      DISCONTINUED: HYDROcodone-acetaminophen (NORCO) 5-325 mg per tablet   5.  Encounter for long-term (current) use of high-risk medication Z79.899 V58.69 HYDROcodone-acetaminophen (NORCO) 5-325 mg per tablet      DISCONTINUED: HYDROcodone-acetaminophen (NORCO) 5-325 mg per tablet   6. Facet arthritis of lumbar region M47.816 721. 3 cyclobenzaprine (FLEXERIL) 10 mg tablet        --I do not recommend long term opioid therapy for this patient at this time; risks outweigh potential benefits. Pt currently taking Norco 5/325mg up to 4 times a day as needed with a total of 105 tabs to be budgeted over 30 days. Their MME is 20. --Today, we will continue the weaning of patients opioid medication with a goal of being opioid free, pending safety and compliance. Pt instructed to call if they experience any signs of withdrawal (diarrhea, nausea, vomiting, sweating or chills, agitation, itching). --Today, pt given prescription for Norco 5/325mg up to 4 times a day as needed with a total of 100 tabs to be budgeted over 30 days. --Pt instructed to call 5-7 days before they run out of their medications for refill. At this time pt will be provided with Norco 5/325mg up to 4 times a day as needed with a total of 100 tabs to be budgeted over 30 days pending safety and compliance. --At following refill, pt will be provided with Norco 5/325mg up to 4 times a day as needed with a total of 95 tabs to be budgeted over 30 days pending safety and compliance. --At next office visit, the plan is to provide patient with Norco 5/325mg up to 3 times a day as needed with a total of 90 tabs to be used over 30 days. Their new MME will be 15. --If patient has difficulty with the wean or difficulty with cravings we will consider referral to mental health for ongoing assessment and treatment for opioid use disorder. --Again discussed tylenol optimization; pt instructed he can take an additional three 500mg tabs of tylenol per day with a max of 3000mg acetaminophen from all sources in a 24 hours period. Refill provided today.   --Continue Flexeril as needed; refill provided today. --He has not obtained TENS unit as recommended; advised him he can obtain at low cost at local medical supply store or online. --Continue HEP and exercising at the [facility]. --Consider consult with Dr Michael Rivera regarding epidural steroid injection as patient has had temporary relief with this in the past.  --Follow up ongoing assessment and ongoing development of integrative and comprehensive plan of care for chronic pain. Goals: To establish complementary and integrative plan of care to address chronic pain issues while minimizing pharmaceuticals to maximize patient's function improve quality of life. Education:  Patient again educated on the importance of strict compliance with the opioid care agreement while on opioid therapy. Patient also again educated that they should avoid driving while on chronic opioid therapy. Also advised to avoid alcohol and to avoid benzodiazepines while on opioid therapy. Handouts given regarding opioid safety. Follow-up and Dispositions    · Return in about 3 months (around 9/25/2019) for 30 min. [address] was used for portions of this report. Unintended errors may occur.

## 2019-08-01 DIAGNOSIS — Z79.899 ENCOUNTER FOR LONG-TERM (CURRENT) USE OF HIGH-RISK MEDICATION: ICD-10-CM

## 2019-08-01 DIAGNOSIS — M48.061 LUMBAR FORAMINAL STENOSIS: ICD-10-CM

## 2019-08-01 DIAGNOSIS — M47.816 SPONDYLOSIS OF LUMBAR REGION WITHOUT MYELOPATHY OR RADICULOPATHY: ICD-10-CM

## 2019-08-01 NOTE — TELEPHONE ENCOUNTER
[facility] has called requesting a refill of their controlled medication, Hydrocodone, for the management of chronic pain. Last office visit date: 6/25/19  Last opioid care agreement 12/18  Last UDS was done 2/1/19    Date last  was pulled and reviewed : 8/1/19  Last fill date for medication was 7/4/19    Was the patient compliant when the above report was pulled? yes    Analgesia:80%    Aberrancies: none    ADL's: yes    Adverse Reaction: none    Provider's last note and plan of care reviewed? yes  Request forwarded to provider for review.

## 2019-08-02 RX ORDER — HYDROCODONE BITARTRATE AND ACETAMINOPHEN 5; 325 MG/1; MG/1
1 TABLET ORAL
Qty: 100 TAB | Refills: 0 | Status: SHIPPED | OUTPATIENT
Start: 2019-08-03 | End: 2019-09-05 | Stop reason: SDUPTHER

## 2019-08-02 NOTE — TELEPHONE ENCOUNTER
Spoke with patient after getting 2 points of identity informing patient he has a script ready to pickup at the office, please arrive by 3:45pm and bring a valid picture ID. Patient confirmed.

## 2019-09-05 DIAGNOSIS — M47.816 SPONDYLOSIS OF LUMBAR REGION WITHOUT MYELOPATHY OR RADICULOPATHY: ICD-10-CM

## 2019-09-05 DIAGNOSIS — Z79.899 ENCOUNTER FOR LONG-TERM (CURRENT) USE OF HIGH-RISK MEDICATION: ICD-10-CM

## 2019-09-05 DIAGNOSIS — M48.061 LUMBAR FORAMINAL STENOSIS: ICD-10-CM

## 2019-09-05 RX ORDER — HYDROCODONE BITARTRATE AND ACETAMINOPHEN 5; 325 MG/1; MG/1
1 TABLET ORAL
Qty: 95 TAB | Refills: 0 | Status: SHIPPED | OUTPATIENT
Start: 2019-09-05 | End: 2019-10-15 | Stop reason: SDUPTHER

## 2019-09-05 NOTE — TELEPHONE ENCOUNTER
Royal ZOIE Chance has called requesting a refill of their controlled medication, norco, for the management of chronic pain. Last office visit date: 6/25/19  Last opioid care agreement 12/26/18  Last UDS was done 2/1/19    Date last  was pulled and reviewed : 9/5/19  Last fill date for medication was 8/3/19    Was the patient compliant when the above report was pulled? yes    Analgesia: 80%    Aberrancies: none    ADL's: yes    Adverse Reaction: none    Provider's last note and plan of care reviewed? yes  Request forwarded to provider for review.

## 2019-09-06 ENCOUNTER — OFFICE VISIT (OUTPATIENT)
Dept: PAIN MANAGEMENT | Age: 69
End: 2019-09-06

## 2019-09-06 DIAGNOSIS — Z79.899 ENCOUNTER FOR LONG-TERM (CURRENT) USE OF HIGH-RISK MEDICATION: Primary | ICD-10-CM

## 2019-09-06 LAB
ALCOHOL UR POC: NORMAL
AMPHETAMINES UR POC: NEGATIVE
BARBITURATES UR POC: NORMAL
BENZODIAZEPINES UR POC: NEGATIVE
BUPRENORPHINE UR POC: NEGATIVE
CANNABINOIDS UR POC: NORMAL
CARISOPRODOL UR POC: NORMAL
COCAINE UR POC: NEGATIVE
FENTANYL UR POC: NORMAL
MDMA/ECSTASY UR POC: NORMAL
METHADONE UR POC: NEGATIVE
METHAMPHETAMINE UR POC: NORMAL
METHYLPHENIDATE UR POC: NORMAL
OPIATES UR POC: NEGATIVE
OXYCODONE UR POC: NEGATIVE
PHENCYCLIDINE UR POC: NORMAL
PROPOXYPHENE UR POC: NORMAL
TRAMADOL UR POC: NORMAL
TRICYCLICS UR POC: NORMAL

## 2019-09-06 NOTE — PROGRESS NOTES
The pt provided a urine specimen today in the office for routine drug testing. VORB. POC results were postive for THC and negative for opiates. Pt is on norco. Pt reports that his last dose of medication was 4 days ago. Provider made aware.

## 2019-09-06 NOTE — TELEPHONE ENCOUNTER
After patient gave me 2 points of identity, informed patient that he has a script read to pickup at the office and he needs to come as soon as possible due to the weather with a valid picture ID and he cannot send someone from her permission form to come for him (he has to do a UDS) patient confirmed.

## 2019-10-15 ENCOUNTER — OFFICE VISIT (OUTPATIENT)
Dept: PAIN MANAGEMENT | Age: 69
End: 2019-10-15

## 2019-10-15 ENCOUNTER — DOCUMENTATION ONLY (OUTPATIENT)
Dept: PAIN MANAGEMENT | Age: 69
End: 2019-10-15

## 2019-10-15 VITALS
SYSTOLIC BLOOD PRESSURE: 111 MMHG | WEIGHT: 165 LBS | DIASTOLIC BLOOD PRESSURE: 68 MMHG | TEMPERATURE: 97.4 F | OXYGEN SATURATION: 98 % | HEART RATE: 76 BPM | HEIGHT: 71 IN | RESPIRATION RATE: 14 BRPM | BODY MASS INDEX: 23.1 KG/M2

## 2019-10-15 DIAGNOSIS — G89.29 CHRONIC LEFT-SIDED LOW BACK PAIN WITHOUT SCIATICA: ICD-10-CM

## 2019-10-15 DIAGNOSIS — M54.50 CHRONIC LEFT-SIDED LOW BACK PAIN WITHOUT SCIATICA: ICD-10-CM

## 2019-10-15 DIAGNOSIS — M48.061 LUMBAR FORAMINAL STENOSIS: ICD-10-CM

## 2019-10-15 DIAGNOSIS — M47.816 SPONDYLOSIS OF LUMBAR REGION WITHOUT MYELOPATHY OR RADICULOPATHY: Primary | ICD-10-CM

## 2019-10-15 DIAGNOSIS — G89.4 CHRONIC PAIN SYNDROME: ICD-10-CM

## 2019-10-15 DIAGNOSIS — Z79.899 ENCOUNTER FOR LONG-TERM (CURRENT) USE OF HIGH-RISK MEDICATION: ICD-10-CM

## 2019-10-15 DIAGNOSIS — M47.816 FACET ARTHRITIS OF LUMBAR REGION: ICD-10-CM

## 2019-10-15 RX ORDER — HYDROCODONE BITARTRATE AND ACETAMINOPHEN 5; 325 MG/1; MG/1
1 TABLET ORAL
Qty: 90 TAB | Refills: 0 | Status: SHIPPED | OUTPATIENT
Start: 2019-10-15 | End: 2019-11-11 | Stop reason: SDUPTHER

## 2019-10-15 RX ORDER — SENNOSIDES 8.6 MG/1
1 TABLET ORAL
Qty: 30 TAB | Refills: 2 | Status: SHIPPED | OUTPATIENT
Start: 2019-10-15

## 2019-10-15 RX ORDER — DULOXETIN HYDROCHLORIDE 30 MG/1
30 CAPSULE, DELAYED RELEASE ORAL DAILY
Qty: 30 CAP | Refills: 2 | Status: SHIPPED | OUTPATIENT
Start: 2019-10-15 | End: 2019-11-14

## 2019-10-15 RX ORDER — TIZANIDINE 2 MG/1
2 TABLET ORAL
Qty: 90 TAB | Refills: 0 | Status: SHIPPED | OUTPATIENT
Start: 2019-10-15 | End: 2019-11-14

## 2019-10-15 RX ORDER — HYDROCODONE BITARTRATE AND ACETAMINOPHEN 5; 325 MG/1; MG/1
TABLET ORAL
COMMUNITY
End: 2019-10-15

## 2019-10-15 RX ORDER — DOCUSATE SODIUM 100 MG/1
100 CAPSULE, LIQUID FILLED ORAL
Qty: 60 CAP | Refills: 2 | Status: SHIPPED | OUTPATIENT
Start: 2019-10-15

## 2019-10-15 NOTE — PROGRESS NOTES
The pt's order for H-wave and demographics were faxed over to -[facility] for review. A fax confirmation was received and they will contact the pt.

## 2019-10-15 NOTE — PROGRESS NOTES
Nursing Notes    Patient presents to the office today in follow-up. Patient rates his pain at 8/10 on the numerical pain scale. Reviewed medications with counts as follows:    Rx Date filled Qty Dispensed Pill Count Last Dose Short   Norco 5 mg 09/06/19 95 0 Last week no        reviewed YES  Any aberrancies noted on  NO  Last opioid agreement 10/15/18  Last urine drug screen 04/16/19    Comments:  Patient is here today for a follow up appt today for his chronic low back pain. He states he has a pain level today is an 8  PHQ 2 was done patient denies any depression. Patient states he did not bring his bottle due to it being empty. I educated the patient on the importance of bringing his bottle to each visit. POC UDS was not performed in office today    Any new labs or imaging since last appointment? NO    Have you been to an emergency room (ER) or urgent care clinic since your last visit? NO            Have you been hospitalized since your last visit? NO     If yes, where, when, and reason for visit? Have you seen or consulted any other health care providers outside of the [address] [place] [address]  since your last visit? NO     If yes, where, when, and reason for visit? Mr. Judy Headley has a reminder for a \"due or due soon\" health maintenance. I have asked that he contact his primary care provider for follow-up on this health maintenance.

## 2019-10-15 NOTE — PROGRESS NOTES
Referral date 11/10/15, source Dr Rashad Torres and for LBP. Calculated MEQ - 20  Naloxone rescue - yes  Prophylactic bowel program - no, ordered today  Date of last OCA 12/26/18  Last UDS today, not consistent POC (+) THC, pending confirmatory testing  Prior UDS 9/8/19, not consistent (+) THC 98 ng/ml. Pt states he was out of his Norco and therefore smoking something else \"to take the edge off\". Discussed with him today about strict compliance with his OCA including no driving while on LOT, no use of benzo with on LOT, no alcohol use while on LOT and no illegal drugs while on LOT.  date checked today, findings consistent     Today   Last Visit Prior Visit(s)  ORT - n/a  n/a  n/a  0  PGIC - 6 & 6  incomplete 6 & 7  6 & 6  JOSÉ MIGUEL - 40%  40%  52%  50%  COMM - 4  3  3  1      HPI:  Royal ZOIE Parrish is a 81 y.o. male here for f/u visit for ongoing evaluation of chronic lower back pain. No history of lumbar surgery. Pt was last seen here on 6/25/19. Pt states his wife was sick and put in the hospital and that is why he was not able to been seen until today. Pt denies interval changes on the character or distribution of pain but reports increased intensity. Pain is located lumbar region and described as stabbing. Pain at its best is 6/10. Pain at its worse is 10/10. The pain is worsened by lifting, prolonged sitting, prolonged standing and prolonged walking. Symptoms are improved by heating pad, Bengay, massage temporarily, yoga temporarily, Norco and lumbar epidural steroid injection. Pt has tried NSAIDs, OTC topical medications, trigger point injections, Botox injections, PT, aquatic PT, Gabapentin, Topamax, Lyrica and Elavil with no perceived benefit. Flexeril helps but also makes him too fatigues so he doesn't take it much. He has never tried TENS unit, SCS trial, implantable pain pump, acupuncture, chiropractor or Cymbalta. Since last visit, he has obtained TENS unit but \"can't tell a difference\".  Advised him to bring in the devise at his next office visit for demonstration. He is still interested in consult with Dr Shlomo Weaver regarding ABIGAIL as this helped him in the past; will have him scheduled first available. Pt admits to self increasing his medication; I discussed with him strict compliance with OCA requires him to not self-increase and to take his medication as prescribed. Also discussed with him he is required to bring in his bottle for pill count even if it is empty. Social History     Socioeconomic History    Marital status: UNKNOWN     Spouse name: Not on file    Number of children: Not on file    Years of education: Not on file    Highest education level: Not on file   Occupational History    Not on file   Social Needs    Financial resource strain: Not on file    Food insecurity:     Worry: Not on file     Inability: Not on file    Transportation needs:     Medical: Not on file     Non-medical: Not on file   Tobacco Use    Smoking status: Former Smoker    Smokeless tobacco: Never Used   Substance and Sexual Activity    Alcohol use:  Yes    Drug use: No     Comment: hx of cocaine \"1344\" and thc \"2014\"    Sexual activity: Not on file   Lifestyle    Physical activity:     Days per week: Not on file     Minutes per session: Not on file    Stress: Not on file   Relationships    Social connections:     Talks on phone: Not on file     Gets together: Not on file     Attends Mandaeism service: Not on file     Active member of club or organization: Not on file     Attends meetings of clubs or organizations: Not on file     Relationship status: Not on file    Intimate partner violence:     Fear of current or ex partner: Not on file     Emotionally abused: Not on file     Physically abused: Not on file     Forced sexual activity: Not on file   Other Topics Concern    Not on file   Social History Narrative    Not on file     Family History   Problem Relation Age of Onset    Hypertension Mother    [address] [facility] [address] Seizures Mother     Heart Attack Father      No Known Allergies  Past Medical History:   Diagnosis Date    Current smoker     DDD (degenerative disc disease), lumbar     Essential hypertension     HLD (hyperlipidemia)     Vitamin D deficiency      No past surgical history on file. Current Outpatient Medications on File Prior to Visit   Medication Sig    acetaminophen (TYLENOL EXTRA STRENGTH) 500 mg tablet Take 1 tablets PO TID prn pain. Max dose of 3000mg/day. Indications: Pain    Omega-3 Fatty Acids (FISH OIL) 500 mg cap Take  by mouth.  ASIF ASPIRIN PO Take  by mouth.  sildenafil citrate (VIAGRA) 50 mg tablet Take 50 mg by mouth as needed.  MV-MN/IRON/FA/VIT K/K. GINSENG ([employer] PO) Take  by mouth.  [DISCONTINUED] HYDROcodone-acetaminophen (NORCO) 5-325 mg per tablet Take  by mouth.  [DISCONTINUED] HYDROcodone-acetaminophen (NORCO) 5-325 mg per tablet Take 1 Tab by mouth every six (6) hours as needed for Pain (#95 tabs to be budgeted over 30 days) for up to 30 days. Max Daily Amount: 4 Tabs. No current facility-administered medications on file prior to visit. ROS:  Denies fever, chills, nausea, vomiting, diarrhea, constipation, abdominal pain, chest pain, shortness or breath/trouble breathing, weakness, trouble swallowing, changes in vision, changes in hearing, falls, dizziness, bladder incontinence, bowel incontinence, depression, anxiety, suicidal ideations, homicidal ideations or alcohol use. Opioid specific risk: [facility]x [facility] and cocaine use, multiple aberrancies including: (+) undisclosed Tramadol on UDS, (+) THC and Ethyl glucuronide and Ethyl sulfate on UDS. Vitals:    10/15/19 0852   BP: 111/68   Pulse: 76   Resp: 14   Temp: 97.4 °F (36.3 °C)   SpO2: 98%   Weight: 74.8 kg (165 lb)   Height: 5' 11\" (1.803 m)   PainSc:   8   PainLoc: Back         Physical exam:  AFVSS, no acute distress, normal body habitus. A&OXs 3. Normocephalic, atraumatic. Conjugate gaze, clear sclerae. Speech is clear and appropriate. Mood is appropriate and patient is cooperative. Gait and balance are within functional limits. Non-labored breathing. Primary Care Physician  Zuleyka Richards  [phone]      PHQ -- .  3 most recent PHQ Screens 10/15/2019   Little interest or pleasure in doing things Not at all   Feeling down, depressed, irritable, or hopeless Not at all   Total Score PHQ 2 0         Assessment/Plan:     ICD-10-CM ICD-9-CM    1. Spondylosis of lumbar region without myelopathy or radiculopathy M47.816 721.3 tiZANidine (ZANAFLEX) 2 mg tablet      AMB SUPPLY ORDER      REFERRAL TO PSYCHOLOGY      HYDROcodone-acetaminophen (NORCO) 5-325 mg per tablet      DULoxetine (CYMBALTA) 30 mg capsule   2. Lumbar foraminal stenosis M48.061 724.02 tiZANidine (ZANAFLEX) 2 mg tablet      AMB SUPPLY ORDER      REFERRAL TO PSYCHOLOGY      HYDROcodone-acetaminophen (NORCO) 5-325 mg per tablet      DULoxetine (CYMBALTA) 30 mg capsule   3. Chronic pain syndrome G89.4 338.4 tiZANidine (ZANAFLEX) 2 mg tablet      AMB SUPPLY ORDER      REFERRAL TO PSYCHOLOGY      DULoxetine (CYMBALTA) 30 mg capsule   4. Chronic left-sided low back pain without sciatica M54.5 724.2 tiZANidine (ZANAFLEX) 2 mg tablet    G89.29 338.29 AMB SUPPLY ORDER      REFERRAL TO PSYCHOLOGY      DULoxetine (CYMBALTA) 30 mg capsule   5. Facet arthritis of lumbar region M47.816 721.3 tiZANidine (ZANAFLEX) 2 mg tablet      AMB SUPPLY ORDER      REFERRAL TO PSYCHOLOGY      DULoxetine (CYMBALTA) 30 mg capsule   6. Encounter for long-term (current) use of high-risk medication Z79.899 V58.69 HYDROcodone-acetaminophen (NORCO) 5-325 mg per tablet        1) Medications (opiod and non-opiod)  --I do not recommend long term opioid therapy for this patient at this time; risks outweigh potential benefits.  Pt currently taking Norco 5/325mg up to 4 times a day as needed with a total of 95 tabs to be budgeted over 30 days. Their MME is 20. Pt states his last dose was 10/3/19 and he denies having had any withdrawal symptoms. --Today, we will continue the weaning of patients opioid medication with a goal of being opioid free, pending safety and compliance. Pt instructed to call if they experience any signs of withdrawal (diarrhea, nausea, vomiting, sweating or chills, agitation, itching). He is provided with Norco 5/325mg up to 3 times a day as needed with a total of 90 tabs to be budgeted over 30 days. His new MME will be 15. --Pt instructed to call 5-7 days before they run out of their medications for refill. At this time pt will be provided with Norco 5/325mg up to 3 times a day as needed with a total of 85 tabs to be budgeted over 30 days pending safety and compliance. --At following refill, pt will be provided with Norco 5/325mg up to 4 times a day as needed with a total of 85 tabs to be budgeted over 30 days pending safety and compliance. --At next office visit, the plan is to provide patient with Norco 5/325mg up to 3 times a day as needed with a total of 80 tabs to be budgeted over 30 days. If patient has difficulty with the wean or difficulty with cravings we will consider referral to mental health for ongoing assessment and treatment for opioid use disorder. --Tylenol optimization discussed; pt instructed he can take a total of 3000mg acetaminophen from all sources in a 24 hours period. --Stop Flexeril due to sedation, will provide him with trial of Zanaflex. Consider upward titration as needed and as tolerated. --Trial of Cymbalta provided today; consider upward titration as needed and as tolerated to 60mg daily. --Senna and Colace ordered today to be used PRN constipation. 2) Restorative Therapies  --Has obtained TENS unit as recommended; advised him he can bring the device into his next office visit for demonstration. --Continue HEP and exercising at the [facility].   --In office h-wave therapy trial ordered today as I think he would significantly benefit from this. 3) Interventional Procedures  --Pt to follow up with Dr Maddie Price regarding possible lumbar epidural steroid injection as he had in past with significant benefit. 4) Behavorial Health Approaches  --Pt would benefit from referral to pain psychology for training and cognitive behavorial therapy, acceptance commitment therapy, biofeedback and mindfulness mediation and other modalities as indicated for treatment of chronic pain once this service is available at our clinic or by referral to Dr Sarahy Pittman. Referral provided today. 5) Complementary & Integrative Health  --Follow up ongoing assessment and ongoing development of integrative and comprehensive plan of care for chronic pain. Goals: To establish complementary and integrative plan of care to address chronic pain issues while minimizing pharmaceuticals to maximize patient's function improve quality of life. Education:  Patient again educated on the importance of strict compliance with the opioid care agreement while on opioid therapy. Patient also again educated that they should avoid driving while on chronic opioid therapy. Also advised to avoid alcohol and to avoid benzodiazepines while on opioid therapy. Handouts given regarding opioid safety. Follow-up and Dispositions    · Return in about 3 months (around 1/15/2020) for 30 min with me AND first available consult appt with Dr Maddie Price for hospitals & [facility] SERVICES. [address] was used for portions of this report. Unintended errors may occur.

## 2019-10-15 NOTE — PATIENT INSTRUCTIONS

## 2019-11-11 DIAGNOSIS — M48.061 LUMBAR FORAMINAL STENOSIS: ICD-10-CM

## 2019-11-11 DIAGNOSIS — Z79.899 ENCOUNTER FOR LONG-TERM (CURRENT) USE OF HIGH-RISK MEDICATION: ICD-10-CM

## 2019-11-11 DIAGNOSIS — M47.816 SPONDYLOSIS OF LUMBAR REGION WITHOUT MYELOPATHY OR RADICULOPATHY: ICD-10-CM

## 2019-11-11 NOTE — TELEPHONE ENCOUNTER
Royal Pierrepatrice Mccoy has called requesting a refill of their controlled medication, norco, for the management of his chronic back pain. Last office visit date: 10/15/19  Last opioid care agreement 01/04/19  Last UDS was done 10/15/19    Date last  was pulled and reviewed : 11/11/19  Last fill date for medication was 10/15/19 for norco 5/325 mg #90 tabs     Was the patient compliant when the above report was pulled? yes    Analgesia: pt reports an 80% pain relief with his current opioid medication regimen     Aberrancies: UDS 9/8/19 (+) THC 98 ng/ml. Provider saw pt on 10/15/19. ADL's: pt reports that he is able to perform his normal daily tasks because he is taking his medication     Adverse Reaction: none reported by the pt at this time. Provider's last note and plan of care reviewed? yes  Request forwarded to provider for review.

## 2019-11-13 RX ORDER — HYDROCODONE BITARTRATE AND ACETAMINOPHEN 5; 325 MG/1; MG/1
1 TABLET ORAL
Qty: 85 TAB | Refills: 0 | Status: SHIPPED | OUTPATIENT
Start: 2019-11-14 | End: 2019-12-14

## 2019-11-13 NOTE — TELEPHONE ENCOUNTER
Approved. Please let patient know their script was electonically sent to their pharmacy on file. Please call patient and move his appt to before 12/10/19.

## 2019-11-15 NOTE — TELEPHONE ENCOUNTER
Pt was contacted and he has already picked up his prescription for his medication. The pt was informed of the office closure that is to happen on 12/12/19. His January appt was moved up to 12/03/19. Mr. Megan Marcum was advised no to miss this appt because it will be important to discuss his future treatment plan with the provider. He verbalized understanding and has no questions at this time.

## 2019-12-11 NOTE — TELEPHONE ENCOUNTER
Attempted to contact patient to discuss office closing, but patient didn't answer the phone, and had to leave a message for the patient to call the office back. Clinic number provided.

## 2021-07-13 NOTE — PROGRESS NOTES
HISTORY OF PRESENT ILLNESS  Royal ZOIE Saeed is a 61 y.o. male. HPI  The patient presents today for follow up of chronic low back pain. No h/o lumbar surgery. Several lumbar epidurals in the past with some temporary benefit. He reports that he moved in mid November which exacerbated his pain and he admits to taking two tabs in the morning. He ran out this morning. He continues to have increased pain especially in the mornings. Counseled extensively regarding opioid safety and treatment plan compliance. He denies any significant changes to his pain. He has been using a heating pain and resting when he can along with the medication which has not been working as well. Options discussed. The cold weather has also aggravated his pain. The patient denies any other significant changes since last f/u. He tolerates medications without side effects. Royal ZOIE Saeed reports no change in sleep or constipation. He does not take a sleep aid. He does not have sleep apnea. He denies constipation. The patient reports 50-70% relief with current medications. He describes his pain as constant, achy and sometimes radiating into his left hip. No h/o joint replacements. He is a  (drives 's to appointments). The patient reports functional improvement and QOL with pain medication. UDS 11/9/16 reviewed and consistent    Review of Systems   Constitutional: Negative for chills and fever. HENT: Negative for congestion and sore throat. Eyes: Negative for blurred vision and double vision. Respiratory: Negative for cough, shortness of breath and wheezing. Cardiovascular: Negative for chest pain and palpitations. Gastrointestinal: Negative for constipation, heartburn and nausea. Genitourinary: Negative for dysuria and urgency. Musculoskeletal: Positive for back pain (occasional) and joint pain. Negative for neck pain. Skin: Negative for itching and rash.    Neurological: Negative for dizziness, seizures and headaches. Endo/Heme/Allergies: Negative for environmental allergies. Does not bruise/bleed easily. Psychiatric/Behavioral: Negative for depression and suicidal ideas. The patient has insomnia. Physical Exam   Constitutional: He is oriented to person, place, and time. He appears well-developed and well-nourished. No distress. HENT:   Head: Normocephalic. Eyes: EOM are normal.   Neck: Normal range of motion. No spinous process tenderness and no muscular tenderness present. Normal range of motion present. Pulmonary/Chest: Effort normal.   Musculoskeletal: He exhibits tenderness (tenderness throughout lumbar spine/painful ROM). Lumbar back: He exhibits decreased range of motion (painful), tenderness, pain and spasm. Neurological: He is alert and oriented to person, place, and time. Gait (antalgic) abnormal.   Psychiatric: He has a normal mood and affect. His behavior is normal. Judgment and thought content normal.   Nursing note and vitals reviewed. ASSESSMENT and PLAN  Encounter Diagnoses   Name Primary?  Chronic pain syndrome Yes    Encounter for long-term (current) use of high-risk medication     Chronic left-sided low back pain without sciatica     Facet arthritis of lumbar region     Spondylosis of lumbar region without myelopathy or radiculopathy     Lumbar foraminal stenosis      Orders Placed This Encounter    DISCONTD: HYDROcodone-acetaminophen (NORCO)  mg tablet    HYDROcodone-acetaminophen (NORCO)  mg tablet      Patient Instructions   1. Please do not self increase your medication. 2.  Increase norco to 10/325 one tab  Up to 2-3 times per day prn severe pain  3. Follow up in two months      40 minutes spent in visit face to face with the patient.     >50% of time spent counseling and coordinating care Ivermectin Counseling:  Patient instructed to take medication on an empty stomach with a full glass of water.  Patient informed of potential adverse effects including but not limited to nausea, diarrhea, dizziness, itching, and swelling of the extremities or lymph nodes.  The patient verbalized understanding of the proper use and possible adverse effects of ivermectin.  All of the patient's questions and concerns were addressed.